# Patient Record
Sex: MALE | Race: WHITE | Employment: OTHER | URBAN - METROPOLITAN AREA
[De-identification: names, ages, dates, MRNs, and addresses within clinical notes are randomized per-mention and may not be internally consistent; named-entity substitution may affect disease eponyms.]

---

## 2019-11-16 ENCOUNTER — APPOINTMENT (OUTPATIENT)
Dept: GENERAL RADIOLOGY | Age: 74
End: 2019-11-16
Payer: MEDICARE

## 2019-11-16 ENCOUNTER — HOSPITAL ENCOUNTER (EMERGENCY)
Age: 74
Discharge: HOME | End: 2019-11-16
Attending: EMERGENCY MEDICINE
Payer: MEDICARE

## 2019-11-16 VITALS
BODY MASS INDEX: 25.33 KG/M2 | HEART RATE: 67 BPM | TEMPERATURE: 98.6 F | WEIGHT: 187 LBS | OXYGEN SATURATION: 97 % | SYSTOLIC BLOOD PRESSURE: 157 MMHG | HEIGHT: 72 IN | RESPIRATION RATE: 12 BRPM | DIASTOLIC BLOOD PRESSURE: 79 MMHG

## 2019-11-16 DIAGNOSIS — G47.00 INSOMNIA, UNSPECIFIED TYPE: Primary | ICD-10-CM

## 2019-11-16 LAB
A/G RATIO: 1.3 (ref 1.1–2.2)
ALBUMIN SERPL-MCNC: 4.3 G/DL (ref 3.4–5)
ALP BLD-CCNC: 60 U/L (ref 40–129)
ALT SERPL-CCNC: 13 U/L (ref 10–40)
ANION GAP SERPL CALCULATED.3IONS-SCNC: 13 MMOL/L (ref 3–16)
AST SERPL-CCNC: 16 U/L (ref 15–37)
BASOPHILS ABSOLUTE: 0.1 K/UL (ref 0–0.2)
BASOPHILS RELATIVE PERCENT: 1 %
BILIRUB SERPL-MCNC: 0.8 MG/DL (ref 0–1)
BILIRUBIN URINE: NEGATIVE
BLOOD, URINE: NEGATIVE
BUN BLDV-MCNC: 12 MG/DL (ref 7–20)
CALCIUM SERPL-MCNC: 9.4 MG/DL (ref 8.3–10.6)
CHLORIDE BLD-SCNC: 104 MMOL/L (ref 99–110)
CLARITY: CLEAR
CO2: 26 MMOL/L (ref 21–32)
COLOR: YELLOW
CREAT SERPL-MCNC: 0.8 MG/DL (ref 0.8–1.3)
EKG ATRIAL RATE: 75 BPM
EKG ATRIAL RATE: 86 BPM
EKG DIAGNOSIS: NORMAL
EKG DIAGNOSIS: NORMAL
EKG P AXIS: 62 DEGREES
EKG P AXIS: 67 DEGREES
EKG P-R INTERVAL: 138 MS
EKG P-R INTERVAL: 154 MS
EKG Q-T INTERVAL: 390 MS
EKG Q-T INTERVAL: 416 MS
EKG QRS DURATION: 84 MS
EKG QRS DURATION: 90 MS
EKG QTC CALCULATION (BAZETT): 464 MS
EKG QTC CALCULATION (BAZETT): 466 MS
EKG R AXIS: 29 DEGREES
EKG R AXIS: 66 DEGREES
EKG T AXIS: 45 DEGREES
EKG T AXIS: 51 DEGREES
EKG VENTRICULAR RATE: 75 BPM
EKG VENTRICULAR RATE: 86 BPM
EOSINOPHILS ABSOLUTE: 0.1 K/UL (ref 0–0.6)
EOSINOPHILS RELATIVE PERCENT: 1.4 %
EPITHELIAL CELLS, UA: NORMAL /HPF
GFR AFRICAN AMERICAN: >60
GFR NON-AFRICAN AMERICAN: >60
GLOBULIN: 3.2 G/DL
GLUCOSE BLD-MCNC: 90 MG/DL (ref 70–99)
GLUCOSE URINE: NEGATIVE MG/DL
HCT VFR BLD CALC: 53.4 % (ref 40.5–52.5)
HEMOGLOBIN: 17.9 G/DL (ref 13.5–17.5)
KETONES, URINE: NEGATIVE MG/DL
LEUKOCYTE ESTERASE, URINE: NEGATIVE
LYMPHOCYTES ABSOLUTE: 1.7 K/UL (ref 1–5.1)
LYMPHOCYTES RELATIVE PERCENT: 29.3 %
MCH RBC QN AUTO: 31.8 PG (ref 26–34)
MCHC RBC AUTO-ENTMCNC: 33.5 G/DL (ref 31–36)
MCV RBC AUTO: 94.8 FL (ref 80–100)
MICROSCOPIC EXAMINATION: NORMAL
MONOCYTES ABSOLUTE: 0.4 K/UL (ref 0–1.3)
MONOCYTES RELATIVE PERCENT: 7 %
NEUTROPHILS ABSOLUTE: 3.5 K/UL (ref 1.7–7.7)
NEUTROPHILS RELATIVE PERCENT: 61.3 %
NITRITE, URINE: NEGATIVE
PDW BLD-RTO: 13.3 % (ref 12.4–15.4)
PH UA: 7.5 (ref 5–8)
PLATELET # BLD: 188 K/UL (ref 135–450)
PMV BLD AUTO: 6.9 FL (ref 5–10.5)
POTASSIUM SERPL-SCNC: 3.9 MMOL/L (ref 3.5–5.1)
PROTEIN UA: NEGATIVE MG/DL
RBC # BLD: 5.63 M/UL (ref 4.2–5.9)
RBC UA: NORMAL /HPF (ref 0–2)
SODIUM BLD-SCNC: 143 MMOL/L (ref 136–145)
SPECIFIC GRAVITY UA: 1.01 (ref 1–1.03)
TOTAL PROTEIN: 7.5 G/DL (ref 6.4–8.2)
TROPONIN: <0.01 NG/ML
URINE TYPE: NORMAL
UROBILINOGEN, URINE: 0.2 E.U./DL
WBC # BLD: 5.7 K/UL (ref 4–11)
WBC UA: NORMAL /HPF (ref 0–5)

## 2019-11-16 PROCEDURE — 99285 EMERGENCY DEPT VISIT HI MDM: CPT

## 2019-11-16 PROCEDURE — 93005 ELECTROCARDIOGRAM TRACING: CPT | Performed by: EMERGENCY MEDICINE

## 2019-11-16 PROCEDURE — 85025 COMPLETE CBC W/AUTO DIFF WBC: CPT

## 2019-11-16 PROCEDURE — 36415 COLL VENOUS BLD VENIPUNCTURE: CPT

## 2019-11-16 PROCEDURE — 84484 ASSAY OF TROPONIN QUANT: CPT

## 2019-11-16 PROCEDURE — 81001 URINALYSIS AUTO W/SCOPE: CPT

## 2019-11-16 PROCEDURE — 93010 ELECTROCARDIOGRAM REPORT: CPT | Performed by: INTERNAL MEDICINE

## 2019-11-16 PROCEDURE — 71046 X-RAY EXAM CHEST 2 VIEWS: CPT

## 2019-11-16 PROCEDURE — 80053 COMPREHEN METABOLIC PANEL: CPT

## 2019-11-16 RX ORDER — CHLORAL HYDRATE 500 MG
3000 CAPSULE ORAL 2 TIMES DAILY
COMMUNITY

## 2019-11-16 RX ORDER — AMLODIPINE BESYLATE AND BENAZEPRIL HYDROCHLORIDE 5; 10 MG/1; MG/1
1 CAPSULE ORAL DAILY
COMMUNITY

## 2019-11-16 SDOH — HEALTH STABILITY: MENTAL HEALTH: HOW OFTEN DO YOU HAVE A DRINK CONTAINING ALCOHOL?: NEVER

## 2019-11-16 ASSESSMENT — ENCOUNTER SYMPTOMS
DIARRHEA: 0
VOMITING: 0
EYE REDNESS: 0
RHINORRHEA: 0
BACK PAIN: 0
ABDOMINAL PAIN: 0
COUGH: 0
NAUSEA: 0
SHORTNESS OF BREATH: 0
EYE DISCHARGE: 0

## 2019-11-16 ASSESSMENT — PAIN DESCRIPTION - PAIN TYPE: TYPE: ACUTE PAIN

## 2019-11-16 ASSESSMENT — PAIN DESCRIPTION - LOCATION: LOCATION: SHOULDER

## 2019-11-16 ASSESSMENT — PAIN DESCRIPTION - ORIENTATION: ORIENTATION: LEFT

## 2019-11-16 ASSESSMENT — PAIN DESCRIPTION - PROGRESSION: CLINICAL_PROGRESSION: NOT CHANGED

## 2019-11-16 ASSESSMENT — PAIN DESCRIPTION - DESCRIPTORS: DESCRIPTORS: ACHING

## 2019-11-16 ASSESSMENT — PAIN DESCRIPTION - ONSET: ONSET: SUDDEN

## 2019-11-16 ASSESSMENT — PAIN SCALES - GENERAL: PAINLEVEL_OUTOF10: 0

## 2019-11-16 ASSESSMENT — PAIN DESCRIPTION - FREQUENCY: FREQUENCY: CONTINUOUS

## 2019-11-16 ASSESSMENT — PAIN - FUNCTIONAL ASSESSMENT: PAIN_FUNCTIONAL_ASSESSMENT: PREVENTS OR INTERFERES SOME ACTIVE ACTIVITIES AND ADLS

## 2020-02-06 LAB
CHOLESTEROL, TOTAL: 207 MG/DL
CHOLESTEROL/HDL RATIO: NORMAL
HDLC SERPL-MCNC: 37 MG/DL (ref 35–70)
LDL CHOLESTEROL CALCULATED: 152 MG/DL (ref 0–160)
NONHDLC SERPL-MCNC: NORMAL MG/DL
TRIGL SERPL-MCNC: 158 MG/DL
VLDLC SERPL CALC-MCNC: NORMAL MG/DL

## 2020-09-24 ENCOUNTER — OFFICE VISIT (OUTPATIENT)
Dept: FAMILY MEDICINE CLINIC | Age: 75
End: 2020-09-24
Payer: MEDICARE

## 2020-09-24 VITALS
DIASTOLIC BLOOD PRESSURE: 80 MMHG | TEMPERATURE: 96.8 F | HEIGHT: 72 IN | OXYGEN SATURATION: 96 % | WEIGHT: 178.4 LBS | BODY MASS INDEX: 24.16 KG/M2 | SYSTOLIC BLOOD PRESSURE: 124 MMHG | HEART RATE: 69 BPM

## 2020-09-24 PROBLEM — N20.0 KIDNEY CALCULI: Status: ACTIVE | Noted: 2020-09-24

## 2020-09-24 PROCEDURE — 4040F PNEUMOC VAC/ADMIN/RCVD: CPT | Performed by: NURSE PRACTITIONER

## 2020-09-24 PROCEDURE — 99204 OFFICE O/P NEW MOD 45 MIN: CPT | Performed by: NURSE PRACTITIONER

## 2020-09-24 PROCEDURE — G8420 CALC BMI NORM PARAMETERS: HCPCS | Performed by: NURSE PRACTITIONER

## 2020-09-24 PROCEDURE — 1036F TOBACCO NON-USER: CPT | Performed by: NURSE PRACTITIONER

## 2020-09-24 PROCEDURE — 3017F COLORECTAL CA SCREEN DOC REV: CPT | Performed by: NURSE PRACTITIONER

## 2020-09-24 PROCEDURE — 1123F ACP DISCUSS/DSCN MKR DOCD: CPT | Performed by: NURSE PRACTITIONER

## 2020-09-24 PROCEDURE — G8427 DOCREV CUR MEDS BY ELIG CLIN: HCPCS | Performed by: NURSE PRACTITIONER

## 2020-09-24 RX ORDER — ACETAMINOPHEN 160 MG
TABLET,DISINTEGRATING ORAL
COMMUNITY

## 2020-09-24 RX ORDER — HYDROXYZINE HYDROCHLORIDE 25 MG/1
25 TABLET, FILM COATED ORAL EVERY 8 HOURS PRN
Qty: 30 TABLET | Refills: 0 | Status: SHIPPED | OUTPATIENT
Start: 2020-09-24 | End: 2020-10-04

## 2020-09-24 RX ORDER — HYDROCHLOROTHIAZIDE 12.5 MG/1
12.5 CAPSULE, GELATIN COATED ORAL EVERY MORNING
Qty: 90 CAPSULE | Refills: 1 | Status: CANCELLED | OUTPATIENT
Start: 2020-09-24

## 2020-09-24 RX ORDER — TRAMADOL HYDROCHLORIDE 50 MG/1
50 TABLET ORAL EVERY 8 HOURS PRN
COMMUNITY
End: 2021-03-29 | Stop reason: ALTCHOICE

## 2020-09-24 RX ORDER — VIT C/B6/B5/MAGNESIUM/HERB 173 50-5-6-5MG
CAPSULE ORAL
COMMUNITY

## 2020-09-24 ASSESSMENT — ENCOUNTER SYMPTOMS
BLOOD IN STOOL: 0
WHEEZING: 0
SORE THROAT: 0
DIARRHEA: 0
ABDOMINAL PAIN: 0
CHOKING: 0
EYE ITCHING: 0
EYE REDNESS: 0
COUGH: 0
CONSTIPATION: 0
SINUS PAIN: 0
EYE PAIN: 0
PHOTOPHOBIA: 0
BACK PAIN: 1
STRIDOR: 0
SHORTNESS OF BREATH: 0
VOMITING: 0
SINUS PRESSURE: 0
NAUSEA: 0
VOICE CHANGE: 0
TROUBLE SWALLOWING: 0
COLOR CHANGE: 0
RHINORRHEA: 0
EYE DISCHARGE: 0
CHEST TIGHTNESS: 0

## 2020-09-24 ASSESSMENT — PATIENT HEALTH QUESTIONNAIRE - PHQ9
SUM OF ALL RESPONSES TO PHQ9 QUESTIONS 1 & 2: 1
2. FEELING DOWN, DEPRESSED OR HOPELESS: 1
1. LITTLE INTEREST OR PLEASURE IN DOING THINGS: 0
SUM OF ALL RESPONSES TO PHQ QUESTIONS 1-9: 1
SUM OF ALL RESPONSES TO PHQ QUESTIONS 1-9: 1

## 2020-09-24 NOTE — PROGRESS NOTES
2020    Lorna Ho (:  1945) is a 76 y.o. male, here for evaluation of the following medical concerns:    HPI    This patient is new to the practice and is here to establish care. The patients prior PCP was the [place]. We reviewed the patients allergies and current medications. We reviewed the patients medical, surgical and family history. He gets his medication from the [place]. He was on ativan in the past for anxiety and his chronic back pain. He served in [employer] and has worked all his life. Chronic kidney stones:    He had back pain and went to [facility], [place] for back pain. He went to Dr. Marito Magana but does not have a plan of care yet. He would like to know what the plan is for his kidney stone prior to moving forward with [address] [place] [address] [address]. Kidney Stone:  He has a non obstructive kidney stone 6mm. He has had many taken care of. Anxiety:He was going to Dr. Marito Magana and he gave him ativan for anti anxiety. He has been off of it since Cancer surgery. He would like to get on something for his worrying. We discussed hydroxizine PRN and he agreed to try it. HTN: Well controlled at this time. Colonoscopy: Last one was 15 years ago. He would like an order for this today. Review of Systems   Constitutional: Negative for activity change, appetite change, chills, diaphoresis, fatigue, fever and unexpected weight change. HENT: Negative for congestion, ear discharge, ear pain, hearing loss, nosebleeds, postnasal drip, rhinorrhea, sinus pressure, sinus pain, sneezing, sore throat, tinnitus, trouble swallowing and voice change. Eyes: Negative for photophobia, pain, discharge, redness and itching. Respiratory: Negative for cough, choking, chest tightness, shortness of breath, wheezing and stridor. Cardiovascular: Negative for chest pain, palpitations and leg swelling. Gastrointestinal: Negative for abdominal pain, blood in stool, constipation, diarrhea, nausea and vomiting. Provider, MD   gabapentin (NEURONTIN) 400 MG capsule 400 mg 2 times daily.   Yes Historical Provider, MD   Multiple Vitamins-Minerals (CENTRUM SILVER PO) Take by mouth Yes Historical Provider, MD   Garlic 4028 MG CAPS Take by mouth Yes Historical Provider, MD   Red Yeast Rice Extract (RED YEAST RICE PO) Take by mouth once a week  Yes Historical Provider, MD   Coenzyme Q10 (CO Q-10) 50 MG CAPS Take 100 mg by mouth daily  Yes Historical Provider, MD        Allergies   Allergen Reactions    Statins      Other reaction(s): Pain  Pain in back of legs    Medrol [Methylprednisolone]     Morphine      Kidney stone    Quinine Sulfate [Quinine]        Past Medical History:   Diagnosis Date    Cancer ([facility] [address]     prostate    Heart disease     HTN (hypertension)     Kidney stone        Past Surgical History:   Procedure Laterality Date    BACK SURGERY      fractures disc (Dr. Ange Castaneda at [facility]) [place] in back   [address] LITHOTRIPSY Left 2017    [facility]    PROSTATECTOMY  2018    prostate cancer       Social History     Socioeconomic History    Marital status:      Spouse name: Not on file    Number of children: Not on file    Years of education: Not on file    Highest education level: Not on file   Occupational History    Not on file   Social Needs    Financial resource strain: Not on file    Food insecurity     Worry: Not on file     Inability: Not on file    Transportation needs     Medical: Not on file     Non-medical: Not on file   Tobacco Use    Smoking status: Former Smoker     Packs/day: 0.50     Years: 3.00     Pack years: 1.50     Types: Cigarettes     Last attempt to quit: 1970     Years since quittin.7    Smokeless tobacco: Former User   Substance and Sexual Activity    Alcohol use: Never     Frequency: Never    Drug use: Never    Sexual activity: Not Currently Lifestyle    Physical activity     Days per week: Not on file     Minutes per session: Not on file    Stress: Not on file   Relationships    Social connections     Talks on phone: Not on file     Gets together: Not on file     Attends Yazidi service: Not on file     Active member of club or organization: Not on file     Attends meetings of clubs or organizations: Not on file     Relationship status: Not on file    Intimate partner violence     Fear of current or ex partner: Not on file     Emotionally abused: Not on file     Physically abused: Not on file     Forced sexual activity: Not on file   Other Topics Concern    Not on file   Social History Narrative    Not on file        No family history on file. Vitals:    09/24/20 1041   BP: 124/80   Pulse: 69   Temp: 96.8 °F (36 °C)   SpO2: 96%   Weight: 178 lb 6.4 oz (80.9 kg)   Height: 6' (1.829 m)     Estimated body mass index is 24.2 kg/m² as calculated from the following:    Height as of this encounter: 6' (1.829 m). Weight as of this encounter: 178 lb 6.4 oz (80.9 kg). Physical Exam  Vitals signs reviewed. Constitutional:       General: He is not in acute distress. Appearance: Normal appearance. He is well-developed. HENT:      Head: Normocephalic and atraumatic. Right Ear: Hearing and external ear normal.      Left Ear: Hearing and external ear normal.      Nose: Nose normal.      Right Sinus: No maxillary sinus tenderness or frontal sinus tenderness. Left Sinus: No maxillary sinus tenderness or frontal sinus tenderness. Mouth/Throat:      Pharynx: No oropharyngeal exudate. Eyes:      General:         Right eye: No discharge. Left eye: No discharge. Conjunctiva/sclera: Conjunctivae normal.      Pupils: Pupils are equal, round, and reactive to light. Neck:      Musculoskeletal: Normal range of motion. Thyroid: No thyromegaly. Vascular: No JVD. Trachea: No tracheal deviation. Cardiovascular:      Rate and Rhythm: Normal rate and regular rhythm. Heart sounds: Normal heart sounds. No murmur. No friction rub. Pulmonary:      Effort: Pulmonary effort is normal. No respiratory distress. Breath sounds: Normal breath sounds. No stridor. No decreased breath sounds, wheezing, rhonchi or rales. Abdominal:      General: Bowel sounds are normal. There is no distension. Palpations: Abdomen is soft. There is no mass. Tenderness: There is no abdominal tenderness. There is no guarding or rebound. Musculoskeletal: Normal range of motion. General: No tenderness. Lymphadenopathy:      Cervical: No cervical adenopathy. Skin:     General: Skin is warm and dry. Capillary Refill: Capillary refill takes less than 2 seconds. Findings: No rash. Neurological:      Mental Status: He is alert and oriented to person, place, and time. Sensory: Sensation is intact. Motor: Motor function is intact. Coordination: Coordination normal.   Psychiatric:         Attention and Perception: Attention and perception normal.         Mood and Affect: Mood normal.         Speech: Speech normal.         Behavior: Behavior normal. Behavior is cooperative. Thought Content: Thought content normal.         Cognition and Memory: Cognition normal.         Judgment: Judgment normal.         ASSESSMENT/PLAN:  1. Establishing care with new doctor, encounter for    - Turmeric 500 MG CAPS; Take by mouth  - Cholecalciferol (VITAMIN D3) 50 MCG (2000 UT) CAPS; Take by mouth  - traMADol (ULTRAM) 50 MG tablet; Take 50 mg by mouth every 8 hours as needed for Pain.  - KENISHA Aceves MD, Gastroenterology, [facility]  - hydrOXYzine (ATARAX) 25 MG tablet; Take 1 tablet by mouth every 8 hours as needed for Itching  Dispense: 30 tablet; Refill: 0    2. Kidney calculi  -He is following up with Dr. Vassie Heimlich    3. Renal calculus, left  -see above    4.  Heart disease    - Turmeric 500 MG CAPS; Take by mouth  - Cholecalciferol (VITAMIN D3) 50 MCG (2000 UT) CAPS; Take by mouth    5. Essential hypertension    - Turmeric 500 MG CAPS; Take by mouth  - Cholecalciferol (VITAMIN D3) 50 MCG (2000 UT) CAPS; Take by mouth    6. Encounter for screening for malignant neoplasm of colon    - [facility] - Ted Valdes MD, Gastroenterology, [facility]    7. Anxiety    - hydrOXYzine (ATARAX) 25 MG tablet; Take 1 tablet by mouth every 8 hours as needed for Itching  Dispense: 30 tablet; Refill: 0    8. Chronic right-sided low back pain without sciatica    - traMADol (ULTRAM) 50 MG tablet; Take 50 mg by mouth every 8 hours as needed for Pain. Follow up if symptoms do not improve or worsen. If the patient becomes short of breath go straight to the ER or call 911. He would like to follow up in one month for anxiety. Educate don the health fair and reviewed his labs drawn in February All labs essentially normal. He will see if he wants labs in February or get the health fair labs. Return in about 1 month (around 10/24/2020). An  electronic signature was used to authenticate this note.     --LOBITO Salazar - CNP on 9/24/2020 at 11:35 AM

## 2020-10-28 ENCOUNTER — OFFICE VISIT (OUTPATIENT)
Dept: FAMILY MEDICINE CLINIC | Age: 75
End: 2020-10-28
Payer: MEDICARE

## 2020-10-28 VITALS
OXYGEN SATURATION: 95 % | TEMPERATURE: 98 F | DIASTOLIC BLOOD PRESSURE: 72 MMHG | SYSTOLIC BLOOD PRESSURE: 118 MMHG | HEART RATE: 70 BPM | BODY MASS INDEX: 23.92 KG/M2 | WEIGHT: 176.4 LBS

## 2020-10-28 PROCEDURE — 1036F TOBACCO NON-USER: CPT | Performed by: NURSE PRACTITIONER

## 2020-10-28 PROCEDURE — G8484 FLU IMMUNIZE NO ADMIN: HCPCS | Performed by: NURSE PRACTITIONER

## 2020-10-28 PROCEDURE — 3017F COLORECTAL CA SCREEN DOC REV: CPT | Performed by: NURSE PRACTITIONER

## 2020-10-28 PROCEDURE — G8420 CALC BMI NORM PARAMETERS: HCPCS | Performed by: NURSE PRACTITIONER

## 2020-10-28 PROCEDURE — 99213 OFFICE O/P EST LOW 20 MIN: CPT | Performed by: NURSE PRACTITIONER

## 2020-10-28 PROCEDURE — G8427 DOCREV CUR MEDS BY ELIG CLIN: HCPCS | Performed by: NURSE PRACTITIONER

## 2020-10-28 PROCEDURE — 1123F ACP DISCUSS/DSCN MKR DOCD: CPT | Performed by: NURSE PRACTITIONER

## 2020-10-28 PROCEDURE — 4040F PNEUMOC VAC/ADMIN/RCVD: CPT | Performed by: NURSE PRACTITIONER

## 2020-10-28 ASSESSMENT — ENCOUNTER SYMPTOMS
ABDOMINAL DISTENTION: 1
COLOR CHANGE: 0
EYE ITCHING: 0
DIARRHEA: 0
CHEST TIGHTNESS: 0
EYE REDNESS: 0
CHOKING: 0
VOMITING: 0
WHEEZING: 0
PHOTOPHOBIA: 0
SHORTNESS OF BREATH: 0
CONSTIPATION: 1
EYE DISCHARGE: 0
STRIDOR: 0
RHINORRHEA: 0
SINUS PAIN: 0
BLOOD IN STOOL: 0
SORE THROAT: 0
BACK PAIN: 0
ABDOMINAL PAIN: 0
VOICE CHANGE: 0
EYE PAIN: 0
COUGH: 0
NAUSEA: 0
SINUS PRESSURE: 0
TROUBLE SWALLOWING: 0

## 2020-10-28 NOTE — PROGRESS NOTES
10/28/2020     Wendy Peter (:  1945) is a 76 y.o. male, here for evaluation of the following medical concerns:    HPI    Constipation: He has been having issues with this post op. He had not gone for 4 days. He finally had a BM last weekend. He has not gone since then. He has had issues in the past with this. The last few days he took ducalax sodium. Stool was hard. He will increase water. He is nervous. He would like to follow up with GI. We will get a KUB. Kidney stone: He had his kidney stone removed and it was successful. HTN: His BP is well controlled in the office today. Wants to get the flu shot at the [place]. Review of Systems   Constitutional: Negative for activity change, appetite change, chills, diaphoresis, fatigue, fever and unexpected weight change. HENT: Negative for congestion, ear discharge, ear pain, hearing loss, nosebleeds, postnasal drip, rhinorrhea, sinus pressure, sinus pain, sneezing, sore throat, tinnitus, trouble swallowing and voice change. Eyes: Negative for photophobia, pain, discharge, redness and itching. Respiratory: Negative for cough, choking, chest tightness, shortness of breath, wheezing and stridor. Cardiovascular: Negative for chest pain, palpitations and leg swelling. Gastrointestinal: Positive for abdominal distention and constipation. Negative for abdominal pain, blood in stool, diarrhea, nausea and vomiting. Endocrine: Negative for cold intolerance, heat intolerance, polydipsia and polyuria. Genitourinary: Negative for difficulty urinating, dysuria, enuresis, flank pain, frequency, hematuria and urgency. Musculoskeletal: Negative for back pain, gait problem, joint swelling, neck pain and neck stiffness. Skin: Negative for color change, pallor, rash and wound. Allergic/Immunologic: Negative for environmental allergies and food allergies.    Neurological: Negative for dizziness, tremors, syncope, speech difficulty, weakness, light-headedness, numbness and headaches. Hematological: Negative for adenopathy. Does not bruise/bleed easily. Psychiatric/Behavioral: Negative for agitation, behavioral problems, confusion, decreased concentration, dysphoric mood, hallucinations, self-injury, sleep disturbance and suicidal ideas. The patient is nervous/anxious. The patient is not hyperactive. Prior to Visit Medications    Medication Sig Taking? Authorizing Provider   Turmeric 500 MG CAPS Take by mouth Yes Historical Provider, MD   Cholecalciferol (VITAMIN D3) 50 MCG (2000 UT) CAPS Take by mouth Yes Historical Provider, MD   traMADol (ULTRAM) 50 MG tablet Take 50 mg by mouth every 8 hours as needed for Pain. Yes Historical Provider, MD   amLODIPine-benazepril (LOTREL) 5-10 MG per capsule Take 1 capsule by mouth daily Yes Historical Provider, MD   Omega-3 Fatty Acids (FISH OIL) 1000 MG CAPS Take 3,000 mg by mouth 2 times daily Yes Historical Provider, MD   NONFORMULARY Take by mouth daily NERVE RENEW  Contains:  B-1, B-12 2000 mcg, R-Alpha Lopic Acid 150 mg, B-6 4 mg, Riboflavin 4 mg, Vitamin D 500 u, Benfodaime 300 mg, Proprietary Blend 43 mg Yes Historical Provider, MD   aspirin 81 MG chewable tablet Take 81 mg by mouth once a week  Yes Historical Provider, MD   gabapentin (NEURONTIN) 400 MG capsule 400 mg 2 times daily.   Yes Historical Provider, MD   Multiple Vitamins-Minerals (CENTRUM SILVER PO) Take by mouth Yes Historical Provider, MD   Garlic 8227 MG CAPS Take by mouth Yes Historical Provider, MD   Red Yeast Rice Extract (RED YEAST RICE PO) Take by mouth once a week  Yes Historical Provider, MD   Coenzyme Q10 (CO Q-10) 50 MG CAPS Take 100 mg by mouth daily  Yes Historical Provider, MD        Social History     Tobacco Use    Smoking status: Former Smoker     Packs/day: 0.50     Years: 3.00     Pack years: 1.50     Types: Cigarettes     Last attempt to quit: 1970     Years since quittin.8    Smokeless tobacco: Former User Substance Use Topics    Alcohol use: Never     Frequency: Never        Vitals:    10/28/20 0752   BP: 118/72   Pulse: 70   Temp: 98 °F (36.7 °C)   SpO2: 95%   Weight: 176 lb 6.4 oz (80 kg)     Estimated body mass index is 23.92 kg/m² as calculated from the following:    Height as of 9/24/20: 6' (1.829 m). Weight as of this encounter: 176 lb 6.4 oz (80 kg). Physical Exam  Vitals signs reviewed. Constitutional:       General: He is not in acute distress. Appearance: Normal appearance. He is well-developed. HENT:      Head: Normocephalic and atraumatic. Right Ear: Hearing and external ear normal.      Left Ear: Hearing and external ear normal.      Nose: Nose normal.      Right Sinus: No maxillary sinus tenderness or frontal sinus tenderness. Left Sinus: No maxillary sinus tenderness or frontal sinus tenderness. Neck:      Musculoskeletal: Normal range of motion. Thyroid: No thyromegaly. Vascular: No JVD. Trachea: No tracheal deviation. Pulmonary:      Effort: Pulmonary effort is normal.      Breath sounds: No decreased breath sounds. Abdominal:      General: Bowel sounds are normal. There is no distension. Palpations: Abdomen is soft. There is no mass. Tenderness: There is no abdominal tenderness. There is no right CVA tenderness, left CVA tenderness, guarding or rebound. Hernia: No hernia is present. Musculoskeletal: Normal range of motion. General: No tenderness. Skin:     General: Skin is warm and dry. Capillary Refill: Capillary refill takes less than 2 seconds. Findings: No rash. Neurological:      Mental Status: He is alert and oriented to person, place, and time. Sensory: Sensation is intact. Motor: Motor function is intact.       Coordination: Coordination normal.   Psychiatric:         Attention and Perception: Attention and perception normal.         Mood and Affect: Mood normal.         Speech: Speech normal. Behavior: Behavior normal. Behavior is cooperative. Thought Content: Thought content normal.         Cognition and Memory: Cognition normal.         Judgment: Judgment normal.         ASSESSMENT/PLAN:  1. Constipation, unspecified constipation type    - XR ABDOMEN (KUB) (SINGLE AP VIEW); Future    -Will follow up with results. He will see GI and add on Miralax daily as well as increase water intake. Return if symptoms worsen or fail to improve. An electronic signature was used to authenticate this note.     --LOBITO Hewitt - CNP on 10/28/2020 at 8:11 AM

## 2020-11-11 ENCOUNTER — OFFICE VISIT (OUTPATIENT)
Dept: FAMILY MEDICINE CLINIC | Age: 75
End: 2020-11-11
Payer: MEDICARE

## 2020-11-11 VITALS
DIASTOLIC BLOOD PRESSURE: 78 MMHG | WEIGHT: 178 LBS | BODY MASS INDEX: 24.14 KG/M2 | OXYGEN SATURATION: 96 % | HEART RATE: 72 BPM | TEMPERATURE: 97.8 F | SYSTOLIC BLOOD PRESSURE: 122 MMHG

## 2020-11-11 PROCEDURE — 1036F TOBACCO NON-USER: CPT | Performed by: NURSE PRACTITIONER

## 2020-11-11 PROCEDURE — 4040F PNEUMOC VAC/ADMIN/RCVD: CPT | Performed by: NURSE PRACTITIONER

## 2020-11-11 PROCEDURE — G8420 CALC BMI NORM PARAMETERS: HCPCS | Performed by: NURSE PRACTITIONER

## 2020-11-11 PROCEDURE — G8484 FLU IMMUNIZE NO ADMIN: HCPCS | Performed by: NURSE PRACTITIONER

## 2020-11-11 PROCEDURE — 99213 OFFICE O/P EST LOW 20 MIN: CPT | Performed by: NURSE PRACTITIONER

## 2020-11-11 PROCEDURE — 3017F COLORECTAL CA SCREEN DOC REV: CPT | Performed by: NURSE PRACTITIONER

## 2020-11-11 PROCEDURE — 1123F ACP DISCUSS/DSCN MKR DOCD: CPT | Performed by: NURSE PRACTITIONER

## 2020-11-11 PROCEDURE — G8427 DOCREV CUR MEDS BY ELIG CLIN: HCPCS | Performed by: NURSE PRACTITIONER

## 2020-11-11 RX ORDER — FAMOTIDINE 20 MG/1
20 TABLET, FILM COATED ORAL 2 TIMES DAILY
Qty: 60 TABLET | Refills: 3 | Status: SHIPPED | OUTPATIENT
Start: 2020-11-11 | End: 2021-12-29

## 2020-11-11 ASSESSMENT — ENCOUNTER SYMPTOMS
SHORTNESS OF BREATH: 0
EYE ITCHING: 0
PHOTOPHOBIA: 0
COUGH: 0
RHINORRHEA: 0
DIARRHEA: 0
SORE THROAT: 0
WHEEZING: 0
EYE REDNESS: 0
TROUBLE SWALLOWING: 0
CHOKING: 0
EYE PAIN: 0
EYE DISCHARGE: 0
SINUS PAIN: 0
ABDOMINAL PAIN: 1
STRIDOR: 0
CHEST TIGHTNESS: 0
COLOR CHANGE: 0
SINUS PRESSURE: 0
VOMITING: 0
VOICE CHANGE: 0
BLOOD IN STOOL: 0
BACK PAIN: 0
NAUSEA: 0
CONSTIPATION: 1

## 2020-11-11 NOTE — PROGRESS NOTES
Negative for chest pain, palpitations and leg swelling. Gastrointestinal: Positive for abdominal pain and constipation. Negative for blood in stool, diarrhea, nausea and vomiting. Endocrine: Negative for cold intolerance, heat intolerance, polydipsia and polyuria. Genitourinary: Negative for difficulty urinating, dysuria, enuresis, flank pain, frequency, hematuria and urgency. Musculoskeletal: Negative for back pain, gait problem, joint swelling, neck pain and neck stiffness. Skin: Negative for color change, pallor, rash and wound. Allergic/Immunologic: Negative for environmental allergies and food allergies. Neurological: Negative for dizziness, tremors, syncope, speech difficulty, weakness, light-headedness, numbness and headaches. Hematological: Negative for adenopathy. Does not bruise/bleed easily. Psychiatric/Behavioral: Negative for agitation, behavioral problems, confusion, decreased concentration, dysphoric mood, hallucinations, self-injury, sleep disturbance and suicidal ideas. The patient is not nervous/anxious and is not hyperactive. Prior to Visit Medications    Medication Sig Taking? Authorizing Provider   famotidine (PEPCID) 20 MG tablet Take 1 tablet by mouth 2 times daily Yes LOBITO Dominguez CNP   hydrocortisone 2.5 % cream Apply topically 2 times daily Apply topically 2 times daily. Yes LOBITO Dominguez CNP   Turmeric 500 MG CAPS Take by mouth Yes Historical Provider, MD   Cholecalciferol (VITAMIN D3) 50 MCG (2000 UT) CAPS Take by mouth Yes Historical Provider, MD   traMADol (ULTRAM) 50 MG tablet Take 50 mg by mouth every 8 hours as needed for Pain.  Yes Historical Provider, MD   amLODIPine-benazepril (LOTREL) 5-10 MG per capsule Take 1 capsule by mouth daily Yes Historical Provider, MD   Omega-3 Fatty Acids (FISH OIL) 1000 MG CAPS Take 3,000 mg by mouth 2 times daily Yes Historical Provider, MD   NONFORMULARY Take by mouth daily NERVE RENEW  Contains:  B-1, B-12 2000 mcg, R-Alpha Lopic Acid 150 mg, B-6 4 mg, Riboflavin 4 mg, Vitamin D 500 u, Benfodaime 300 mg, Proprietary Blend 43 mg Yes Historical Provider, MD   aspirin 81 MG chewable tablet Take 81 mg by mouth once a week  Yes Historical Provider, MD   gabapentin (NEURONTIN) 400 MG capsule 400 mg 2 times daily. Yes Historical Provider, MD   Multiple Vitamins-Minerals (CENTRUM SILVER PO) Take by mouth Yes Historical Provider, MD   Garlic 2978 MG CAPS Take by mouth Yes Historical Provider, MD   Red Yeast Rice Extract (RED YEAST RICE PO) Take by mouth once a week  Yes Historical Provider, MD   Coenzyme Q10 (CO Q-10) 50 MG CAPS Take 100 mg by mouth daily  Yes Historical Provider, MD       Allergies   Allergen Reactions    Statins      Other reaction(s): Pain  Pain in back of legs    Medrol [Methylprednisolone]     Morphine      Kidney stone    Quinine Sulfate [Quinine]        OBJECTIVE:      BP Readings from Last 2 Encounters:   11/11/20 122/78   10/28/20 118/72       Wt Readings from Last 3 Encounters:   11/11/20 178 lb (80.7 kg)   10/28/20 176 lb 6.4 oz (80 kg)   09/24/20 178 lb 6.4 oz (80.9 kg)       Physical Exam  Vitals signs reviewed. Constitutional:       General: He is not in acute distress. Appearance: Normal appearance. He is well-developed. HENT:      Head: Normocephalic and atraumatic. Right Ear: Hearing and external ear normal.      Left Ear: Hearing and external ear normal.      Nose: Nose normal.      Right Sinus: No maxillary sinus tenderness or frontal sinus tenderness. Left Sinus: No maxillary sinus tenderness or frontal sinus tenderness. Mouth/Throat:      Pharynx: No oropharyngeal exudate. Eyes:      General:         Right eye: No discharge. Left eye: No discharge. Conjunctiva/sclera: Conjunctivae normal.      Pupils: Pupils are equal, round, and reactive to light. Neck:      Musculoskeletal: Normal range of motion. Thyroid: No thyromegaly. tablet; Take 1 tablet by mouth 2 times daily  Dispense: 60 tablet; Refill: 3    Maintain hydration by drinking small amounts of clear fluids frequently, then soft diet, and then advance diet as tolerated. Call if symptoms worsen, high fever, severe weakness or fainting, increased abdominal pain, blood in stool or vomit, or failure to improve in 2-3 days.

## 2021-03-29 ENCOUNTER — OFFICE VISIT (OUTPATIENT)
Dept: FAMILY MEDICINE CLINIC | Age: 76
End: 2021-03-29
Payer: MEDICARE

## 2021-03-29 VITALS
SYSTOLIC BLOOD PRESSURE: 126 MMHG | DIASTOLIC BLOOD PRESSURE: 74 MMHG | WEIGHT: 180 LBS | BODY MASS INDEX: 25.77 KG/M2 | HEIGHT: 70 IN | TEMPERATURE: 97.1 F | OXYGEN SATURATION: 96 % | HEART RATE: 68 BPM

## 2021-03-29 DIAGNOSIS — C61 PRIMARY MALIGNANT NEOPLASM OF PROSTATE (HCC): ICD-10-CM

## 2021-03-29 DIAGNOSIS — J30.2 SEASONAL ALLERGIC RHINITIS, UNSPECIFIED TRIGGER: ICD-10-CM

## 2021-03-29 DIAGNOSIS — J01.90 ACUTE BACTERIAL SINUSITIS: ICD-10-CM

## 2021-03-29 DIAGNOSIS — B96.89 ACUTE BACTERIAL SINUSITIS: ICD-10-CM

## 2021-03-29 DIAGNOSIS — Z00.00 ROUTINE GENERAL MEDICAL EXAMINATION AT A HEALTH CARE FACILITY: Primary | ICD-10-CM

## 2021-03-29 PROCEDURE — 3017F COLORECTAL CA SCREEN DOC REV: CPT | Performed by: NURSE PRACTITIONER

## 2021-03-29 PROCEDURE — 99213 OFFICE O/P EST LOW 20 MIN: CPT | Performed by: NURSE PRACTITIONER

## 2021-03-29 PROCEDURE — 4040F PNEUMOC VAC/ADMIN/RCVD: CPT | Performed by: NURSE PRACTITIONER

## 2021-03-29 PROCEDURE — G0438 PPPS, INITIAL VISIT: HCPCS | Performed by: NURSE PRACTITIONER

## 2021-03-29 PROCEDURE — 1036F TOBACCO NON-USER: CPT | Performed by: NURSE PRACTITIONER

## 2021-03-29 PROCEDURE — G8417 CALC BMI ABV UP PARAM F/U: HCPCS | Performed by: NURSE PRACTITIONER

## 2021-03-29 PROCEDURE — G8484 FLU IMMUNIZE NO ADMIN: HCPCS | Performed by: NURSE PRACTITIONER

## 2021-03-29 PROCEDURE — 1123F ACP DISCUSS/DSCN MKR DOCD: CPT | Performed by: NURSE PRACTITIONER

## 2021-03-29 PROCEDURE — G8427 DOCREV CUR MEDS BY ELIG CLIN: HCPCS | Performed by: NURSE PRACTITIONER

## 2021-03-29 RX ORDER — CEFDINIR 300 MG/1
300 CAPSULE ORAL 2 TIMES DAILY
Qty: 20 CAPSULE | Refills: 0 | Status: SHIPPED | OUTPATIENT
Start: 2021-03-29 | End: 2021-04-08

## 2021-03-29 ASSESSMENT — PATIENT HEALTH QUESTIONNAIRE - PHQ9
SUM OF ALL RESPONSES TO PHQ QUESTIONS 1-9: 0
SUM OF ALL RESPONSES TO PHQ QUESTIONS 1-9: 0
1. LITTLE INTEREST OR PLEASURE IN DOING THINGS: 0
SUM OF ALL RESPONSES TO PHQ QUESTIONS 1-9: 0

## 2021-03-29 ASSESSMENT — ENCOUNTER SYMPTOMS
NAUSEA: 0
TROUBLE SWALLOWING: 0
EYE ITCHING: 0
SWOLLEN GLANDS: 0
SORE THROAT: 0
EYE PAIN: 0
CONSTIPATION: 0
SINUS PAIN: 1
PHOTOPHOBIA: 0
VOICE CHANGE: 0
BACK PAIN: 0
DIARRHEA: 0
VOMITING: 0
SINUS PRESSURE: 1
CHOKING: 0
BLOOD IN STOOL: 0
EYE DISCHARGE: 0
COLOR CHANGE: 0
ABDOMINAL PAIN: 0
COUGH: 0
SHORTNESS OF BREATH: 0
STRIDOR: 0
RHINORRHEA: 1
WHEEZING: 0
CHEST TIGHTNESS: 0
EYE REDNESS: 0

## 2021-03-29 NOTE — PATIENT INSTRUCTIONS
Personalized Preventive Plan for Maurizio Littlester - 3/29/2021  Medicare offers a range of preventive health benefits. Some of the tests and screenings are paid in full while other may be subject to a deductible, co-insurance, and/or copay. Some of these benefits include a comprehensive review of your medical history including lifestyle, illnesses that may run in your family, and various assessments and screenings as appropriate. After reviewing your medical record and screening and assessments performed today your provider may have ordered immunizations, labs, imaging, and/or referrals for you. A list of these orders (if applicable) as well as your Preventive Care list are included within your After Visit Summary for your review. Other Preventive Recommendations:    · A preventive eye exam performed by an eye specialist is recommended every 1-2 years to screen for glaucoma; cataracts, macular degeneration, and other eye disorders. · A preventive dental visit is recommended every 6 months. · Try to get at least 150 minutes of exercise per week or 10,000 steps per day on a pedometer . · Order or download the FREE \"Exercise & Physical Activity: Your Everyday Guide\" from [employer]. Call 6-948.866.6807 or search [employer] online. · You need 1188-0660 mg of calcium and 3991-4263 IU of vitamin D per day. It is possible to meet your calcium requirement with diet alone, but a vitamin D supplement is usually necessary to meet this goal.  · When exposed to the sun, use a sunscreen that protects against both UVA and UVB radiation with an SPF of 30 or greater. Reapply every 2 to 3 hours or after sweating, drying off with a towel, or swimming. · Always wear a seat belt when traveling in a car. Always wear a helmet when riding a bicycle or motorcycle.

## 2021-03-29 NOTE — PROGRESS NOTES
Medicare Annual Wellness Visit  Name: Julian Murrell Date: 3/29/2021   MRN: <[id number]> Sex: Male   Age: 76 y.o. Ethnicity: Non-/Non    : 1945 Race: Pavel King is here for Medicare AWV and Congestion    Screenings for behavioral, psychosocial and functional/safety risks, and cognitive dysfunction are all negative except as indicated below. These results, as well as other patient data from the [address] form, are documented in Flowsheets linked to this Encounter. Allergies   Allergen Reactions    Statins      Other reaction(s): Pain  Pain in back of legs    Medrol [Methylprednisolone]     Morphine      Kidney stone    Quinine Sulfate [Quinine]          Prior to Visit Medications    Medication Sig Taking? Authorizing Provider   cefdinir (OMNICEF) 300 MG capsule Take 1 capsule by mouth 2 times daily for 10 days Yes LOBITO Woody CNP   famotidine (PEPCID) 20 MG tablet Take 1 tablet by mouth 2 times daily Yes LOBITO Woody CNP   hydrocortisone 2.5 % cream Apply topically 2 times daily Apply topically 2 times daily. Yes LOBITO Woody CNP   Turmeric 500 MG CAPS Take by mouth Yes Historical Provider, MD   Cholecalciferol (VITAMIN D3) 50 MCG (2000 UT) CAPS Take by mouth Yes Historical Provider, MD   amLODIPine-benazepril (LOTREL) 5-10 MG per capsule Take 1 capsule by mouth daily Yes Historical Provider, MD   Omega-3 Fatty Acids (FISH OIL) 1000 MG CAPS Take 3,000 mg by mouth 2 times daily Yes Historical Provider, MD   NONFORMULARY Take by mouth daily NERVE RENEW  Contains:  B-1, B-12 2000 mcg, R-Alpha Lopic Acid 150 mg, B-6 4 mg, Riboflavin 4 mg, Vitamin D 500 u, Benfodaime 300 mg, Proprietary Blend 43 mg Yes Historical Provider, MD   aspirin 81 MG chewable tablet Take 81 mg by mouth once a week  Yes Historical Provider, MD   gabapentin (NEURONTIN) 400 MG capsule 400 mg 2 times daily.   Yes Historical Provider, MD   Garlic 2936 MG CAPS Take by mouth Yes Historical Provider, MD   Red Yeast Rice Extract (RED YEAST RICE PO) Take by mouth once a week  Yes Historical Provider, MD   Coenzyme Q10 (CO Q-10) 50 MG CAPS Take 100 mg by mouth daily  Yes Historical Provider, MD   Multiple Vitamins-Minerals (CENTRUM SILVER PO) Take by mouth  Historical Provider, MD         Past Medical History:   Diagnosis Date    Cancer ([address]     prostate    Heart disease     HTN (hypertension)     Kidney stone        Past Surgical History:   Procedure Laterality Date    BACK SURGERY      fractures disc (Dr. Stacia Carmichael at [facility]) [place] in back   [address] LITHOTRIPSY Left 12/13/2017    [address]  2018    prostate cancer       History reviewed. No pertinent family history. CareTeam (Including outside providers/suppliers regularly involved in providing care):   Patient Care Team:  LOBITO Garcia CNP as PCP - General (Nurse Practitioner)  LOBITO Garcia CNP as PCP - [facility] Empaneled Provider    Wt Readings from Last 3 Encounters:   03/29/21 180 lb (81.6 kg)   11/11/20 178 lb (80.7 kg)   10/28/20 176 lb 6.4 oz (80 kg)     Vitals:    03/29/21 0800   BP: 126/74   Pulse: 68   Temp: 97.1 °F (36.2 °C)   SpO2: 96%   Weight: 180 lb (81.6 kg)   Height: 5' 10.25\" (1.784 m)     Sinusitis  This is a new problem. The current episode started more than 1 month ago. The problem has been waxing and waning since onset. There has been no fever. Associated symptoms include congestion, ear pain, sinus pressure and sneezing. Pertinent negatives include no chills, coughing, diaphoresis, headaches, neck pain, shortness of breath, sore throat or swollen glands. (States he gets congested at night.  ) Past treatments include antibiotics and oral decongestants (took 4 days of abx Dr. Haleigh Samuels gave him). The treatment provided mild relief.      Chronic: Prostate Cancer,  This is stable at this time per patient. He see's Dr. Sravani Bowie for this. Review of Systems   Constitutional: Negative for activity change, appetite change, chills, diaphoresis, fatigue, fever and unexpected weight change. HENT: Positive for congestion, ear pain, postnasal drip, rhinorrhea, sinus pressure, sinus pain and sneezing. Negative for ear discharge, hearing loss, nosebleeds, sore throat, tinnitus, trouble swallowing and voice change. Eyes: Negative for photophobia, pain, discharge, redness and itching. Respiratory: Negative for cough, choking, chest tightness, shortness of breath, wheezing and stridor. Cardiovascular: Negative for chest pain, palpitations and leg swelling. Gastrointestinal: Negative for abdominal pain, blood in stool, constipation, diarrhea, nausea and vomiting. Endocrine: Negative for cold intolerance, heat intolerance, polydipsia and polyuria. Genitourinary: Negative for difficulty urinating, dysuria, enuresis, flank pain, frequency, hematuria and urgency. Musculoskeletal: Negative for back pain, gait problem, joint swelling, neck pain and neck stiffness. Skin: Negative for color change, pallor, rash and wound. Allergic/Immunologic: Negative for environmental allergies and food allergies. Neurological: Negative for dizziness, tremors, syncope, speech difficulty, weakness, light-headedness, numbness and headaches. Hematological: Negative for adenopathy. Does not bruise/bleed easily. Psychiatric/Behavioral: Positive for behavioral problems. Negative for agitation, confusion, decreased concentration, dysphoric mood, hallucinations, self-injury, sleep disturbance and suicidal ideas. The patient is not nervous/anxious and is not hyperactive. Physical Exam  Vitals signs reviewed. Constitutional:       General: He is not in acute distress. Appearance: Normal appearance. He is well-developed. HENT:      Head: Normocephalic and atraumatic. Right Ear: Hearing, ear canal and external ear normal. There is impacted cerumen. Left Ear: Hearing, tympanic membrane, ear canal and external ear normal.      Nose: Congestion and rhinorrhea present. Right Sinus: Frontal sinus tenderness present. No maxillary sinus tenderness. Left Sinus: Frontal sinus tenderness present. No maxillary sinus tenderness. Mouth/Throat:      Pharynx: No oropharyngeal exudate. Eyes:      General:         Right eye: No discharge. Left eye: No discharge. Conjunctiva/sclera: Conjunctivae normal.      Pupils: Pupils are equal, round, and reactive to light. Neck:      Musculoskeletal: Normal range of motion. Thyroid: No thyromegaly. Vascular: No JVD. Trachea: No tracheal deviation. Cardiovascular:      Rate and Rhythm: Normal rate and regular rhythm. Heart sounds: Normal heart sounds. No murmur. No friction rub. Pulmonary:      Effort: Pulmonary effort is normal. No respiratory distress. Breath sounds: Normal breath sounds. No stridor. No decreased breath sounds, wheezing, rhonchi or rales. Musculoskeletal: Normal range of motion. General: No tenderness. Lymphadenopathy:      Cervical: No cervical adenopathy. Skin:     General: Skin is warm and dry. Capillary Refill: Capillary refill takes less than 2 seconds. Findings: No rash. Neurological:      Mental Status: He is alert and oriented to person, place, and time. Sensory: Sensation is intact. Motor: Motor function is intact. Coordination: Coordination normal.   Psychiatric:         Attention and Perception: Attention and perception normal.         Mood and Affect: Mood normal.         Speech: Speech normal.         Behavior: Behavior normal. Behavior is cooperative. Thought Content:  Thought content normal.         Cognition and Memory: Cognition normal.         Judgment: Judgment normal.       Body mass index is 25.64 kg/m². Based upon direct observation of the patient, evaluation of cognition reveals recent and remote memory intact. Patient's complete Health Risk Assessment and screening values have been reviewed and are found in Flowsheets. The following problems were reviewed today and where indicated follow up appointments were made and/or referrals ordered. Positive Risk Factor Screenings with Interventions:            General Health and ACP:  General  In general, how would you say your health is?: Fair  In the past 7 days, have you experienced any of the following? New or Increased Pain, New or Increased Fatigue, Loneliness, Social Isolation, Stress or Anger?: (!) Social Isolation, Stress  Do you get the social and emotional support that you need?: Yes  Do you have a Living Will?: Yes  Advance Directives     Power of [facility] Will ACP-Advance Directive ACP-Power of     Not on File Not on File Not on File Not on File      General Health Risk Interventions:  · Social isolation: hard to stay home since retiring, hard to keep up with wife. · Stress: he states his wife works constantly and he cant keep up       ADL:  ADLs  In the past 7 days, did you need help from others to perform any of the following everyday activities? Eating, dressing, grooming, bathing, toileting, or walking/balance?: None  In the past 7 days, did you need help from others to take care of any of the following?  Laundry, housekeeping, banking/finances, shopping, telephone use, food preparation, transportation, or taking medications?: (!) Housekeeping, Food Preparation  ADL Interventions:  · Stess taking care of mother in law    Personalized Preventive Plan   Current Health Maintenance Status  Immunization History   Administered Date(s) Administered    Pneumococcal Polysaccharide ([id number]) 12/18/2013    Tdap (Boostrix, Adacel) 02/06/2020    Zoster Recombinant (Shingrix) 02/06/2020        Health Maintenance   Topic Date Due   

## 2021-05-07 ENCOUNTER — OFFICE VISIT (OUTPATIENT)
Dept: FAMILY MEDICINE CLINIC | Age: 76
End: 2021-05-07
Payer: MEDICARE

## 2021-05-07 VITALS
HEART RATE: 57 BPM | OXYGEN SATURATION: 96 % | BODY MASS INDEX: 25.79 KG/M2 | SYSTOLIC BLOOD PRESSURE: 122 MMHG | WEIGHT: 181 LBS | DIASTOLIC BLOOD PRESSURE: 74 MMHG

## 2021-05-07 DIAGNOSIS — R22.9 SKIN NODULE: Primary | ICD-10-CM

## 2021-05-07 PROCEDURE — 99213 OFFICE O/P EST LOW 20 MIN: CPT | Performed by: NURSE PRACTITIONER

## 2021-05-07 PROCEDURE — 1036F TOBACCO NON-USER: CPT | Performed by: NURSE PRACTITIONER

## 2021-05-07 PROCEDURE — 3017F COLORECTAL CA SCREEN DOC REV: CPT | Performed by: NURSE PRACTITIONER

## 2021-05-07 PROCEDURE — G8427 DOCREV CUR MEDS BY ELIG CLIN: HCPCS | Performed by: NURSE PRACTITIONER

## 2021-05-07 PROCEDURE — 1123F ACP DISCUSS/DSCN MKR DOCD: CPT | Performed by: NURSE PRACTITIONER

## 2021-05-07 PROCEDURE — G8417 CALC BMI ABV UP PARAM F/U: HCPCS | Performed by: NURSE PRACTITIONER

## 2021-05-07 PROCEDURE — 4040F PNEUMOC VAC/ADMIN/RCVD: CPT | Performed by: NURSE PRACTITIONER

## 2021-05-07 RX ORDER — SULFAMETHOXAZOLE AND TRIMETHOPRIM 800; 160 MG/1; MG/1
1 TABLET ORAL 2 TIMES DAILY
Qty: 14 TABLET | Refills: 0 | Status: SHIPPED | OUTPATIENT
Start: 2021-05-07 | End: 2021-05-14

## 2021-05-07 ASSESSMENT — ENCOUNTER SYMPTOMS
TROUBLE SWALLOWING: 0
PHOTOPHOBIA: 0
EYE PAIN: 0
VOMITING: 0
CHOKING: 0
VOICE CHANGE: 0
STRIDOR: 0
CONSTIPATION: 0
BACK PAIN: 0
CHEST TIGHTNESS: 0
SORE THROAT: 0
EYE REDNESS: 0
EYE ITCHING: 0
WHEEZING: 0
DIARRHEA: 0
COUGH: 0
BLOOD IN STOOL: 0
ABDOMINAL PAIN: 0
SINUS PAIN: 0
EYE DISCHARGE: 0
COLOR CHANGE: 1
RHINORRHEA: 0
SHORTNESS OF BREATH: 0
NAUSEA: 0
SINUS PRESSURE: 0

## 2021-05-07 NOTE — PROGRESS NOTES
Chief Complaint   Patient presents with    Cyst     knot under left armpit        /74   Pulse 57   Wt 181 lb (82.1 kg)   SpO2 96%   BMI 25.79 kg/m²     HPI:  Tho Wu is a 76 y.o. (: 1945) here today   for   HPI    Patient's medications, allergies, past medical, surgical, social and family histories were reviewed and updated as appropriate. Left armpit nodule:    He noticed a knot under his left armpit a few days ago. He has a hx of limes disease. He has a fear of getting this again. Denies fever and chills. ROS:  Review of Systems   Constitutional: Negative for activity change, appetite change, chills, diaphoresis, fatigue, fever and unexpected weight change. HENT: Negative for congestion, ear discharge, ear pain, hearing loss, nosebleeds, postnasal drip, rhinorrhea, sinus pressure, sinus pain, sneezing, sore throat, tinnitus, trouble swallowing and voice change. Eyes: Negative for photophobia, pain, discharge, redness and itching. Respiratory: Negative for cough, choking, chest tightness, shortness of breath, wheezing and stridor. Cardiovascular: Negative for chest pain, palpitations and leg swelling. Gastrointestinal: Negative for abdominal pain, blood in stool, constipation, diarrhea, nausea and vomiting. Endocrine: Negative for cold intolerance, heat intolerance, polydipsia and polyuria. Genitourinary: Negative for difficulty urinating, dysuria, enuresis, flank pain, frequency, hematuria and urgency. Musculoskeletal: Negative for back pain, gait problem, joint swelling, neck pain and neck stiffness. Skin: Positive for color change and wound. Negative for pallor and rash. Allergic/Immunologic: Negative for environmental allergies and food allergies. Neurological: Negative for dizziness, tremors, syncope, speech difficulty, weakness, light-headedness, numbness and headaches. Hematological: Negative for adenopathy. Does not bruise/bleed easily. Psychiatric/Behavioral: Negative for agitation, behavioral problems, confusion, decreased concentration, dysphoric mood, hallucinations, self-injury, sleep disturbance and suicidal ideas. The patient is not nervous/anxious and is not hyperactive. Prior to Visit Medications    Medication Sig Taking? Authorizing Provider   sulfamethoxazole-trimethoprim (BACTRIM DS;SEPTRA DS) 800-160 MG per tablet Take 1 tablet by mouth 2 times daily for 7 days Yes LOBITO Jason CNP   famotidine (PEPCID) 20 MG tablet Take 1 tablet by mouth 2 times daily Yes LOBITO Jason CNP   hydrocortisone 2.5 % cream Apply topically 2 times daily Apply topically 2 times daily. Yes LOBITO Jason CNP   Turmeric 500 MG CAPS Take by mouth Yes Historical Provider, MD   Cholecalciferol (VITAMIN D3) 50 MCG (2000 UT) CAPS Take by mouth Yes Historical Provider, MD   amLODIPine-benazepril (LOTREL) 5-10 MG per capsule Take 1 capsule by mouth daily Yes Historical Provider, MD   Omega-3 Fatty Acids (FISH OIL) 1000 MG CAPS Take 3,000 mg by mouth 2 times daily Yes Historical Provider, MD   NONFORMULARY Take by mouth daily NERVE RENEW  Contains:  B-1, B-12 2000 mcg, R-Alpha Lopic Acid 150 mg, B-6 4 mg, Riboflavin 4 mg, Vitamin D 500 u, Benfodaime 300 mg, Proprietary Blend 43 mg Yes Historical Provider, MD   aspirin 81 MG chewable tablet Take 81 mg by mouth once a week  Yes Historical Provider, MD   gabapentin (NEURONTIN) 400 MG capsule 400 mg 2 times daily.   Yes Historical Provider, MD   Multiple Vitamins-Minerals (CENTRUM SILVER PO) Take by mouth Yes Historical Provider, MD   Garlic 4650 MG CAPS Take by mouth Yes Historical Provider, MD   Red Yeast Rice Extract (RED YEAST RICE PO) Take by mouth once a week  Yes Historical Provider, MD   Coenzyme Q10 (CO Q-10) 50 MG CAPS Take 100 mg by mouth daily  Yes Historical Provider, MD       Allergies   Allergen Reactions    Statins      Other reaction(s): Pain  Pain in back of legs    Medrol [Methylprednisolone]     Morphine      Kidney stone    Quinine Sulfate [Quinine]        OBJECTIVE:      BP Readings from Last 2 Encounters:   05/07/21 122/74   03/29/21 126/74       Wt Readings from Last 3 Encounters:   05/07/21 181 lb (82.1 kg)   03/29/21 180 lb (81.6 kg)   11/11/20 178 lb (80.7 kg)       Physical Exam  Vitals signs reviewed. Constitutional:       General: He is not in acute distress. Appearance: Normal appearance. He is well-developed. HENT:      Head: Normocephalic and atraumatic. Right Ear: Hearing and external ear normal.      Left Ear: Hearing and external ear normal.      Nose: Nose normal.      Right Sinus: No maxillary sinus tenderness or frontal sinus tenderness. Left Sinus: No maxillary sinus tenderness or frontal sinus tenderness. Mouth/Throat:      Pharynx: No oropharyngeal exudate. Eyes:      General:         Right eye: No discharge. Left eye: No discharge. Conjunctiva/sclera: Conjunctivae normal.      Pupils: Pupils are equal, round, and reactive to light. Neck:      Musculoskeletal: Normal range of motion. Thyroid: No thyromegaly. Vascular: No JVD. Trachea: No tracheal deviation. Cardiovascular:      Rate and Rhythm: Normal rate and regular rhythm. Heart sounds: Normal heart sounds. No murmur. No friction rub. Pulmonary:      Effort: Pulmonary effort is normal. No respiratory distress. Breath sounds: Normal breath sounds. No stridor. No decreased breath sounds, wheezing, rhonchi or rales. Abdominal:      General: Bowel sounds are normal. There is no distension. Palpations: Abdomen is soft. There is no mass. Tenderness: There is no abdominal tenderness. There is no guarding or rebound. Musculoskeletal: Normal range of motion. General: No tenderness. Lymphadenopathy:      Cervical: No cervical adenopathy. Skin:     General: Skin is warm and dry.       Capillary Refill: Capillary refill takes less than 2 seconds. Findings: Erythema and lesion present. No rash. Neurological:      Mental Status: He is alert and oriented to person, place, and time. Sensory: Sensation is intact. Motor: Motor function is intact. Coordination: Coordination normal.   Psychiatric:         Attention and Perception: Attention and perception normal.         Mood and Affect: Mood normal.         Speech: Speech normal.         Behavior: Behavior normal. Behavior is cooperative. Thought Content: Thought content normal.         Cognition and Memory: Cognition normal.         Judgment: Judgment normal.           ASSESSMENT/PLAN:    1. Skin nodule    - Marisol Craig MD, General Surgery, [facility]  - sulfamethoxazole-trimethoprim (BACTRIM DS;SEPTRA DS) 800-160 MG per tablet; Take 1 tablet by mouth 2 times daily for 7 days  Dispense: 14 tablet; Refill: 0    Follow up if symptoms do not improve or worsen. If the patient becomes short of breath go straight to the ER or call 911.

## 2021-12-17 ENCOUNTER — TELEPHONE (OUTPATIENT)
Dept: FAMILY MEDICINE CLINIC | Age: 76
End: 2021-12-17

## 2021-12-17 RX ORDER — VALACYCLOVIR HYDROCHLORIDE 1 G/1
1000 TABLET, FILM COATED ORAL EVERY 8 HOURS
Qty: 21 TABLET | Refills: 0 | Status: SHIPPED | OUTPATIENT
Start: 2021-12-17 | End: 2021-12-24

## 2021-12-17 NOTE — TELEPHONE ENCOUNTER
Pt wife called in stating patient has shingles on his back. Blisters about 1 week. Wanting to know if you will call in a cream. He is covid positive. She also wanted to schedule him for blood work d/t issues with fatigue.  Has had shingles vaccine

## 2021-12-17 NOTE — TELEPHONE ENCOUNTER
Rec valtrex 1 gm po q8h x 7d. Once over covid, can arrange f/u appt so we can decide on appropriate labs.

## 2021-12-29 ENCOUNTER — OFFICE VISIT (OUTPATIENT)
Dept: FAMILY MEDICINE CLINIC | Age: 76
End: 2021-12-29
Payer: MEDICARE

## 2021-12-29 VITALS
WEIGHT: 175 LBS | BODY MASS INDEX: 25.05 KG/M2 | HEART RATE: 96 BPM | HEIGHT: 70 IN | OXYGEN SATURATION: 96 % | SYSTOLIC BLOOD PRESSURE: 120 MMHG | DIASTOLIC BLOOD PRESSURE: 72 MMHG

## 2021-12-29 DIAGNOSIS — U07.1 COVID-19 VIRUS INFECTION: Primary | ICD-10-CM

## 2021-12-29 DIAGNOSIS — R14.0 ABDOMINAL BLOATING: ICD-10-CM

## 2021-12-29 DIAGNOSIS — R53.83 OTHER FATIGUE: ICD-10-CM

## 2021-12-29 DIAGNOSIS — Z85.46 H/O PROSTATE CANCER: ICD-10-CM

## 2021-12-29 DIAGNOSIS — I10 PRIMARY HYPERTENSION: ICD-10-CM

## 2021-12-29 PROCEDURE — G8420 CALC BMI NORM PARAMETERS: HCPCS | Performed by: FAMILY MEDICINE

## 2021-12-29 PROCEDURE — 4040F PNEUMOC VAC/ADMIN/RCVD: CPT | Performed by: FAMILY MEDICINE

## 2021-12-29 PROCEDURE — 1123F ACP DISCUSS/DSCN MKR DOCD: CPT | Performed by: FAMILY MEDICINE

## 2021-12-29 PROCEDURE — 1036F TOBACCO NON-USER: CPT | Performed by: FAMILY MEDICINE

## 2021-12-29 PROCEDURE — G8427 DOCREV CUR MEDS BY ELIG CLIN: HCPCS | Performed by: FAMILY MEDICINE

## 2021-12-29 PROCEDURE — 99214 OFFICE O/P EST MOD 30 MIN: CPT | Performed by: FAMILY MEDICINE

## 2021-12-29 PROCEDURE — G8484 FLU IMMUNIZE NO ADMIN: HCPCS | Performed by: FAMILY MEDICINE

## 2021-12-29 ASSESSMENT — ENCOUNTER SYMPTOMS
SHORTNESS OF BREATH: 1
ABDOMINAL DISTENTION: 1

## 2021-12-29 NOTE — PROGRESS NOTES
Not on file    Highest education level: Not on file   Occupational History    Not on file   Tobacco Use    Smoking status: Former Smoker     Packs/day: 0.50     Years: 3.00     Pack years: 1.50     Types: Cigarettes     Quit date: 1970     Years since quittin.0    Smokeless tobacco: Former User   Vaping Use    Vaping Use: Never used   Substance and Sexual Activity    Alcohol use: Never    Drug use: Never    Sexual activity: Not Currently   Other Topics Concern    Not on file   Social History Narrative    Not on file     Social Determinants of Health     Financial Resource Strain:     Difficulty of Paying Living Expenses: Not on file   Food Insecurity:     Worried About [address] in the Last Year: Not on file    [address] [place] in the Last Year: Not on file   Transportation Needs:     Lack of Transportation (Medical): Not on file    Lack of Transportation (Non-Medical): Not on file   Physical Activity:     Days of Exercise per Week: Not on file    Minutes of Exercise per Session: Not on file   Stress:     Feeling of Stress : Not on file   Social Connections:     Frequency of Communication with Friends and Family: Not on file    Frequency of Social Gatherings with Friends and Family: Not on file    Attends Faith Services: Not on file    Active Member of [address] [place] [address] or Organizations: Not on file    Attends Club or Organization Meetings: Not on file    Marital Status: Not on file   Intimate Partner Violence:     Fear of Current or Ex-Partner: Not on file    Emotionally Abused: Not on file    Physically Abused: Not on file    Sexually Abused: Not on file   Housing Stability:     Unable to Pay for Housing in the Last Year: Not on file    Number of [place] in the Last Year: Not on file    Unstable Housing in the Last Year: Not on file       Prior to Visit Medications    Medication Sig Taking?  Authorizing Provider   Cyanocobalamin (VITAMIN B-12 PO) Take by mouth Yes Historical Provider, MD   Turmeric 500 MG CAPS Take by mouth Yes Historical Provider, MD   Cholecalciferol (VITAMIN D3) 50 MCG (2000 UT) CAPS Take by mouth Yes Historical Provider, MD   amLODIPine-benazepril (LOTREL) 5-10 MG per capsule Take 1 capsule by mouth daily Yes Historical Provider, MD   Omega-3 Fatty Acids (FISH OIL) 1000 MG CAPS Take 3,000 mg by mouth 2 times daily Yes Historical Provider, MD   NONFORMULARY Take by mouth daily NERVE RENEW  Contains:  B-1, B-12 2000 mcg, R-Alpha Lopic Acid 150 mg, B-6 4 mg, Riboflavin 4 mg, Vitamin D 500 u, Benfodaime 300 mg, Proprietary Blend 43 mg Yes Historical Provider, MD   aspirin 81 MG chewable tablet Take 81 mg by mouth once a week  Yes Historical Provider, MD   gabapentin (NEURONTIN) 400 MG capsule 400 mg 2 times daily. Yes Historical Provider, MD   Multiple Vitamins-Minerals (CENTRUM SILVER PO) Take by mouth Yes Historical Provider, MD   Garlic 5314 MG CAPS Take by mouth Yes Historical Provider, MD   Red Yeast Rice Extract (RED YEAST RICE PO) Take by mouth once a week  Yes Historical Provider, MD   Coenzyme Q10 (CO Q-10) 50 MG CAPS Take 100 mg by mouth daily  Yes Historical Provider, MD   hydrocortisone 2.5 % cream Apply topically 2 times daily Apply topically 2 times daily. LOBITO Forrest - CNP       Allergies   Allergen Reactions    Statins      Other reaction(s): Pain  Pain in back of legs    Medrol [Methylprednisolone]     Morphine      Kidney stone    Quinine Sulfate [Quinine]        OBJECTIVE:    /72   Pulse 96   Ht 5' 10.25\" (1.784 m)   Wt 175 lb (79.4 kg)   SpO2 96%   BMI 24.93 kg/m²     BP Readings from Last 2 Encounters:   12/29/21 120/72   05/07/21 122/74       Wt Readings from Last 3 Encounters:   12/29/21 175 lb (79.4 kg)   05/07/21 181 lb (82.1 kg)   03/29/21 180 lb (81.6 kg)       Physical Exam  Constitutional:       Appearance: Normal appearance. HENT:      Head: Normocephalic and atraumatic.    Eyes:      Extraocular Movements: Extraocular movements intact. Cardiovascular:      Rate and Rhythm: Normal rate and regular rhythm. Pulmonary:      Effort: Pulmonary effort is normal.      Breath sounds: Normal breath sounds. Abdominal:      Palpations: Abdomen is soft. Tenderness: There is no abdominal tenderness. Musculoskeletal:      Right lower leg: No edema. Left lower leg: No edema. Neurological:      General: No focal deficit present. Mental Status: He is alert and oriented to person, place, and time. Psychiatric:         Mood and Affect: Mood normal.         Behavior: Behavior normal.           ASSESSMENT/PLAN:    1. COVID-19 virus infection  Patient has history of Covid infection in early December. He is overall significantly better, but still having some symptoms of decreased exercise tolerance and rapid heart rate with activity. He was back to the emergency department for concerns for chest pain. Troponin, CBC, BMP negative. Chest x-ray still showed some interstitial findings consistent with residual issues related to recent Covid. Patient states he is overall improving. Wife had some concerns regarding need for potential stress test as it has been 3 years since his last stress test.  Stress test was negative. I think some of his current symptoms are most likely to recovering from Covid. Consider back to cardiology or additional work-up if severe symptoms. Otherwise, follow-up in 6 to 8 weeks. He has been having some fatigue as well, which also could be Covid related. Wife inquired about possible hormone labs. Again, I would wait to see how his symptoms progress and then decide what labs are necessary. He just had CBC and electrolytes done in the hospital approximately 1 week ago. 2. Primary hypertension  Blood pressure well controlled. Continue medications    3. Other fatigue  Seems to be improving. Continue to slowly advance activity.   Consider labs at next appointment if fails to continue to improve    4. H/O prostate cancer  Follow-up with urology    5. Abdominal bloating  Continue probiotic. Discussed adding IBgard. Consider further evaluation if ongoing symptoms          This document was prepared by a combination of typing and transcription through a voice recognition software.

## 2021-12-29 NOTE — PATIENT INSTRUCTIONS
Chicho Greene for stomach issues. Cont to work on endurance. F/u in 6-8 weeks. Consider rpt labs and /or chest xray at that time.

## 2022-06-09 ENCOUNTER — OFFICE VISIT (OUTPATIENT)
Dept: FAMILY MEDICINE CLINIC | Age: 77
End: 2022-06-09
Payer: MEDICARE

## 2022-06-09 VITALS
DIASTOLIC BLOOD PRESSURE: 82 MMHG | OXYGEN SATURATION: 95 % | BODY MASS INDEX: 25.5 KG/M2 | WEIGHT: 179 LBS | HEART RATE: 60 BPM | SYSTOLIC BLOOD PRESSURE: 142 MMHG

## 2022-06-09 DIAGNOSIS — Z01.818 PREOP EXAMINATION: Primary | ICD-10-CM

## 2022-06-09 DIAGNOSIS — R31.9 HEMATURIA, UNSPECIFIED TYPE: ICD-10-CM

## 2022-06-09 DIAGNOSIS — N20.0 KIDNEY STONE: ICD-10-CM

## 2022-06-09 PROBLEM — C61 PRIMARY MALIGNANT NEOPLASM OF PROSTATE (HCC): Status: RESOLVED | Noted: 2021-03-29 | Resolved: 2022-06-09

## 2022-06-09 LAB
BILIRUBIN, POC: NEGATIVE
BLOOD URINE, POC: NEGATIVE
CLARITY, POC: NORMAL
COLOR, POC: YELLOW
GLUCOSE URINE, POC: NEGATIVE
KETONES, POC: NEGATIVE
LEUKOCYTE EST, POC: NEGATIVE
NITRITE, POC: NEGATIVE
PH, POC: 7
PROTEIN, POC: NEGATIVE
SPECIFIC GRAVITY, POC: 1.02
UROBILINOGEN, POC: 0.2

## 2022-06-09 PROCEDURE — 99214 OFFICE O/P EST MOD 30 MIN: CPT

## 2022-06-09 PROCEDURE — 81002 URINALYSIS NONAUTO W/O SCOPE: CPT

## 2022-06-09 PROCEDURE — G8427 DOCREV CUR MEDS BY ELIG CLIN: HCPCS

## 2022-06-09 PROCEDURE — G8417 CALC BMI ABV UP PARAM F/U: HCPCS

## 2022-06-09 PROCEDURE — 93000 ELECTROCARDIOGRAM COMPLETE: CPT

## 2022-06-09 PROCEDURE — 1036F TOBACCO NON-USER: CPT

## 2022-06-09 PROCEDURE — 1123F ACP DISCUSS/DSCN MKR DOCD: CPT

## 2022-06-09 ASSESSMENT — PATIENT HEALTH QUESTIONNAIRE - PHQ9
SUM OF ALL RESPONSES TO PHQ QUESTIONS 1-9: 0
2. FEELING DOWN, DEPRESSED OR HOPELESS: 0
1. LITTLE INTEREST OR PLEASURE IN DOING THINGS: 0
SUM OF ALL RESPONSES TO PHQ QUESTIONS 1-9: 0
SUM OF ALL RESPONSES TO PHQ9 QUESTIONS 1 & 2: 0

## 2022-06-09 ASSESSMENT — ENCOUNTER SYMPTOMS
EYE PAIN: 0
ABDOMINAL PAIN: 0
COLOR CHANGE: 0
COUGH: 0
RHINORRHEA: 0
EYE DISCHARGE: 0
CHEST TIGHTNESS: 0
SHORTNESS OF BREATH: 0
DIARRHEA: 0
CHOKING: 0
WHEEZING: 0
TROUBLE SWALLOWING: 0
SORE THROAT: 0
CONSTIPATION: 0
ABDOMINAL DISTENTION: 0

## 2022-06-09 NOTE — PROGRESS NOTES
Piper Levy  YOB: 1945    Date of Service:  6/9/2022      Wt Readings from Last 2 Encounters:   06/09/22 179 lb (81.2 kg)   12/29/21 175 lb (79.4 kg)       BP Readings from Last 3 Encounters:   06/09/22 (!) 142/82 pt did not take BP meds this AM   12/29/21 120/72   05/07/21 122/74        Chief Complaint   [facility]   [facility]     Lithotripsy by Dr Sloane Rosario on 6/15/22 at [facility]        Allergies   Allergen Reactions    Statins      Other reaction(s): Pain  Pain in back of legs    Medrol [Methylprednisolone]     Morphine      Kidney stone    Quinine Sulfate [Quinine]        Outpatient Medications Marked as Taking for the 6/9/22 encounter (Office Visit) with LOBITO Burton CNP   Medication Sig Dispense Refill    amLODIPine-benazepril (LOTREL) 5-10 MG per capsule Take 1 capsule by mouth daily         This patient presents to the office today for a preoperative consultation  at the request of surgeon, Dr. Lucy Gomez, who plans on performing Lithotripsy on June 15th at [facility]. Planned anesthesia: General   Known anesthesia problems: None   Bleeding risk: Anticoagulant therapy- takes ASA 81mg daily  Personal or FH of DVT/PE: No    Patient objection to receiving blood products: No    Past Medical History:   Diagnosis Date    Heart disease     History of prostate cancer     HTN (hypertension)     Kidney stone        Past Surgical History:   Procedure Laterality Date    BACK SURGERY      fractures disc (Dr. Alvina Mccormack at [facility]) [place] in back   [address] LITHOTRIPSY Left 12/13/2017    [place] [address]  2018    prostate cancer       History reviewed. No pertinent family history.     Social History     Socioeconomic History    Marital status:      Spouse name: Not on file    Number of children: Not on file    Years of education: Not on file    Highest education level: Not on file   Occupational History    Not on file   Tobacco Use    Smoking status: Former Smoker     Packs/day: 0.50     Years: 3.00     Pack years: 1.50     Types: Cigarettes     Quit date: 1970     Years since quittin.4    Smokeless tobacco: Former User   Vaping Use    Vaping Use: Never used   Substance and Sexual Activity    Alcohol use: Never    Drug use: Never    Sexual activity: Not Currently   Other Topics Concern    Not on file   Social History Narrative    Not on file     Social Determinants of Health     Financial Resource Strain:     Difficulty of Paying Living Expenses: Not on file   Food Insecurity:     Worried About [address] in the Last Year: Not on file    [address] [employer] in the Last Year: Not on file   Transportation Needs:     Lack of Transportation (Medical): Not on file    Lack of Transportation (Non-Medical):  Not on file   Physical Activity:     Days of Exercise per Week: Not on file    Minutes of Exercise per Session: Not on file   Stress:     Feeling of Stress : Not on file   Social Connections:     Frequency of Communication with Friends and Family: Not on file    Frequency of Social Gatherings with Friends and Family: Not on file    Attends Confucianism Services: Not on file    Active Member of [address] [place] [address] or Organizations: Not on file    Attends Club or Organization Meetings: Not on file    Marital Status: Not on file   Intimate Partner Violence:     Fear of Current or Ex-Partner: Not on file    Emotionally Abused: Not on file    Physically Abused: Not on file    Sexually Abused: Not on file   Housing Stability:     Unable to Pay for Housing in the Last Year: Not on file    Number of [place] in the Last Year: Not on file    Unstable Housing in the Last Year: Not on file       Review of Systems  Review of Systems   Constitutional: Negative for activity change, appetite change, chills, fatigue, fever and unexpected weight change. HENT: Negative for rhinorrhea, sore throat and trouble swallowing. Eyes: Negative for pain, discharge and visual disturbance. Respiratory: Negative for cough, choking, chest tightness, shortness of breath and wheezing. Cardiovascular: Negative for chest pain, palpitations and leg swelling. Gastrointestinal: Negative for abdominal distention, abdominal pain, constipation and diarrhea. Endocrine: Negative for cold intolerance and heat intolerance. Genitourinary: Positive for flank pain and hematuria. Negative for difficulty urinating. Musculoskeletal: Negative for gait problem and neck stiffness. Skin: Negative for color change and rash. Neurological: Negative for dizziness, weakness and headaches. Psychiatric/Behavioral: Negative for dysphoric mood and sleep disturbance. Vitals:    06/09/22 1120 06/09/22 1126 06/09/22 1147   BP: (!) 162/80 (!) 156/82 (!) 142/82   Site:   Right Upper Arm   Position:   Sitting   Pulse: 60  60   SpO2: 95%     Weight: 179 lb (81.2 kg)          Physical Exam   Physical Exam  Constitutional:       Appearance: Normal appearance. HENT:      Head: Normocephalic and atraumatic. Right Ear: External ear normal.      Left Ear: External ear normal.      Nose: Nose normal. No congestion. Mouth/Throat:      Mouth: Mucous membranes are moist.      Pharynx: Oropharynx is clear. Eyes:      Extraocular Movements: Extraocular movements intact. Pupils: Pupils are equal, round, and reactive to light. Cardiovascular:      Rate and Rhythm: Normal rate and regular rhythm. Pulses: Normal pulses. Heart sounds: Normal heart sounds. No murmur heard. Pulmonary:      Effort: Pulmonary effort is normal. No respiratory distress. Breath sounds: Normal breath sounds. No wheezing. Abdominal:      General: Bowel sounds are normal.      Palpations: Abdomen is soft. Tenderness:  There is no abdominal tenderness. Musculoskeletal:         General: Normal range of motion. Cervical back: Normal range of motion and neck supple. Right lower leg: No edema. Left lower leg: No edema. Skin:     General: Skin is warm and dry. Capillary Refill: Capillary refill takes less than 2 seconds. Findings: No rash. Neurological:      General: No focal deficit present. Mental Status: He is alert and oriented to person, place, and time. Motor: No weakness. Psychiatric:         Mood and Affect: Mood normal.         Behavior: Behavior normal.         EKG Interpretation:  Sinus Rhythm with occ ectopic Ventricular beat. ([id number] for sugery). Lab Review: will draw CMP in office today. Assessment:       68 y.o. patient with planned surgery as above. Known risk factors for perioperative complications: Hypertension, Heart Disease   Current medications which may produce withdrawal symptoms if withheld perioperatively: none    Diagnosis Orders   1. Preop examination  EKG 12 lead    Comprehensive Metabolic Panel    Basic Metabolic Panel   2. Kidney stone     3. Hematuria, unspecified type  POCT Urinalysis no Micro          Plan:     1. Preoperative workup as follows:EKG, CMP  2. Change in medication regimen before surgery: Hold all medications on morning of surgery, hold ASA, Co-Q10, Turmeric, Fish Oil, Red Yeast Rice x7 days.    3. Prophylaxis forcardiac events with perioperative beta-blockers: Not indicated  ACC/AHA indications for pre-operative beta-blocker use:    · Vascular surgery with history of postitive stress test  · Intermediate or high risk surgery with history of CAD   · Intermediate or high risk surgery with multiple clinical predictors of CAD- 2 of the following: history of compensated or prior heart failure, history of cerebrovascular disease, DM, or renal insufficiency    Routine administration of higher-dose, long-acting metoprolol in beta-blocker-naïve patients on the day of surgery, and in the absence of dose titration is with an overall increase in mortality. Beta-blockers should be started days to weeks prior to surgery and titrated to pulse < 70.  4. Deep vein thrombosis prophylaxis: regimen to be chosen by surgical team  5. No contraindications to planned surgery. Patient okay to proceed to operating room from medical standpoint. Please fax H&P with all preop testing to appropriate location. This document was prepared by a combination of typing and transcription through a voice recognition software.     LOBITO Trent - CNP

## 2022-06-10 LAB
A/G RATIO: 1.5 (ref 1.1–2.2)
ALBUMIN SERPL-MCNC: 4.6 G/DL (ref 3.4–5)
ALP BLD-CCNC: 67 U/L (ref 40–129)
ALT SERPL-CCNC: 20 U/L (ref 10–40)
ANION GAP SERPL CALCULATED.3IONS-SCNC: 12 MMOL/L (ref 3–16)
AST SERPL-CCNC: 23 U/L (ref 15–37)
BILIRUB SERPL-MCNC: 0.9 MG/DL (ref 0–1)
BUN BLDV-MCNC: 16 MG/DL (ref 7–20)
CALCIUM SERPL-MCNC: 9.4 MG/DL (ref 8.3–10.6)
CHLORIDE BLD-SCNC: 103 MMOL/L (ref 99–110)
CO2: 25 MMOL/L (ref 21–32)
CREAT SERPL-MCNC: 0.9 MG/DL (ref 0.8–1.3)
GFR AFRICAN AMERICAN: >60
GFR NON-AFRICAN AMERICAN: >60
GLUCOSE BLD-MCNC: 90 MG/DL (ref 70–99)
POTASSIUM SERPL-SCNC: 4.1 MMOL/L (ref 3.5–5.1)
SODIUM BLD-SCNC: 140 MMOL/L (ref 136–145)
TOTAL PROTEIN: 7.6 G/DL (ref 6.4–8.2)

## 2022-06-15 ENCOUNTER — HOSPITAL ENCOUNTER (OUTPATIENT)
Age: 77
Setting detail: SPECIMEN
Discharge: HOME | End: 2022-06-15

## 2022-06-15 PROCEDURE — 82360 CALCULUS ASSAY QUANT: CPT

## 2022-06-22 LAB
STONE COMPOSITION: NORMAL
STONE DESCRIPTION: NORMAL
STONE MASS: 57 MG

## 2022-06-30 ENCOUNTER — OFFICE VISIT (OUTPATIENT)
Dept: FAMILY MEDICINE CLINIC | Age: 77
End: 2022-06-30
Payer: MEDICARE

## 2022-06-30 VITALS
HEART RATE: 77 BPM | DIASTOLIC BLOOD PRESSURE: 78 MMHG | WEIGHT: 178 LBS | BODY MASS INDEX: 25.48 KG/M2 | SYSTOLIC BLOOD PRESSURE: 120 MMHG | OXYGEN SATURATION: 97 % | HEIGHT: 70 IN

## 2022-06-30 DIAGNOSIS — Z00.00 MEDICARE ANNUAL WELLNESS VISIT, SUBSEQUENT: Primary | ICD-10-CM

## 2022-06-30 PROCEDURE — G0439 PPPS, SUBSEQ VISIT: HCPCS

## 2022-06-30 PROCEDURE — 1123F ACP DISCUSS/DSCN MKR DOCD: CPT

## 2022-06-30 ASSESSMENT — PATIENT HEALTH QUESTIONNAIRE - PHQ9
SUM OF ALL RESPONSES TO PHQ QUESTIONS 1-9: 0
SUM OF ALL RESPONSES TO PHQ QUESTIONS 1-9: 0
2. FEELING DOWN, DEPRESSED OR HOPELESS: 0
SUM OF ALL RESPONSES TO PHQ QUESTIONS 1-9: 0
1. LITTLE INTEREST OR PLEASURE IN DOING THINGS: 0
SUM OF ALL RESPONSES TO PHQ9 QUESTIONS 1 & 2: 0
SUM OF ALL RESPONSES TO PHQ QUESTIONS 1-9: 0

## 2022-06-30 ASSESSMENT — LIFESTYLE VARIABLES: HOW OFTEN DO YOU HAVE A DRINK CONTAINING ALCOHOL: NEVER

## 2022-06-30 NOTE — PATIENT INSTRUCTIONS
Personalized Preventive Plan for Barrett Johnson - 6/30/2022  Medicare offers a range of preventive health benefits. Some of the tests and screenings are paid in full while other may be subject to a deductible, co-insurance, and/or copay. Some of these benefits include a comprehensive review of your medical history including lifestyle, illnesses that may run in your family, and various assessments and screenings as appropriate. After reviewing your medical record and screening and assessments performed today your provider may have ordered immunizations, labs, imaging, and/or referrals for you. A list of these orders (if applicable) as well as your Preventive Care list are included within your After Visit Summary for your review. Other Preventive Recommendations:    · A preventive eye exam performed by an eye specialist is recommended every 1-2 years to screen for glaucoma; cataracts, macular degeneration, and other eye disorders. · A preventive dental visit is recommended every 6 months. · Try to get at least 150 minutes of exercise per week or 10,000 steps per day on a pedometer . · Order or download the FREE \"Exercise & Physical Activity: Your Everyday Guide\" from [employer]. Call 1-185.321.8637 or search [employer] online. · You need 6488-1172 mg of calcium and 0597-2420 IU of vitamin D per day. It is possible to meet your calcium requirement with diet alone, but a vitamin D supplement is usually necessary to meet this goal.  · When exposed to the sun, use a sunscreen that protects against both UVA and UVB radiation with an SPF of 30 or greater. Reapply every 2 to 3 hours or after sweating, drying off with a towel, or swimming. · Always wear a seat belt when traveling in a car. Always wear a helmet when riding a bicycle or motorcycle.

## 2022-11-07 ENCOUNTER — OFFICE VISIT (OUTPATIENT)
Dept: FAMILY MEDICINE CLINIC | Age: 77
End: 2022-11-07
Payer: MEDICARE

## 2022-11-07 VITALS
OXYGEN SATURATION: 97 % | HEART RATE: 68 BPM | BODY MASS INDEX: 25.91 KG/M2 | WEIGHT: 181 LBS | HEIGHT: 70 IN | SYSTOLIC BLOOD PRESSURE: 126 MMHG | DIASTOLIC BLOOD PRESSURE: 72 MMHG

## 2022-11-07 DIAGNOSIS — I10 PRIMARY HYPERTENSION: ICD-10-CM

## 2022-11-07 DIAGNOSIS — H60.502 ACUTE OTITIS EXTERNA OF LEFT EAR, UNSPECIFIED TYPE: Primary | ICD-10-CM

## 2022-11-07 PROCEDURE — 1123F ACP DISCUSS/DSCN MKR DOCD: CPT | Performed by: FAMILY MEDICINE

## 2022-11-07 PROCEDURE — G8427 DOCREV CUR MEDS BY ELIG CLIN: HCPCS | Performed by: FAMILY MEDICINE

## 2022-11-07 PROCEDURE — 99213 OFFICE O/P EST LOW 20 MIN: CPT | Performed by: FAMILY MEDICINE

## 2022-11-07 PROCEDURE — 4130F TOPICAL PREP RX AOE: CPT | Performed by: FAMILY MEDICINE

## 2022-11-07 PROCEDURE — 3074F SYST BP LT 130 MM HG: CPT | Performed by: FAMILY MEDICINE

## 2022-11-07 PROCEDURE — 1036F TOBACCO NON-USER: CPT | Performed by: FAMILY MEDICINE

## 2022-11-07 PROCEDURE — 3078F DIAST BP <80 MM HG: CPT | Performed by: FAMILY MEDICINE

## 2022-11-07 PROCEDURE — G8484 FLU IMMUNIZE NO ADMIN: HCPCS | Performed by: FAMILY MEDICINE

## 2022-11-07 PROCEDURE — G8417 CALC BMI ABV UP PARAM F/U: HCPCS | Performed by: FAMILY MEDICINE

## 2022-11-07 RX ORDER — CEFDINIR 300 MG/1
300 CAPSULE ORAL 2 TIMES DAILY
Qty: 20 CAPSULE | Refills: 0 | Status: SHIPPED | OUTPATIENT
Start: 2022-11-07 | End: 2022-11-17

## 2022-11-07 RX ORDER — OFLOXACIN 3 MG/ML
5 SOLUTION AURICULAR (OTIC) 2 TIMES DAILY
Qty: 10 ML | Refills: 0 | Status: SHIPPED | OUTPATIENT
Start: 2022-11-07 | End: 2022-11-17

## 2022-11-07 ASSESSMENT — ENCOUNTER SYMPTOMS: SHORTNESS OF BREATH: 0

## 2022-11-07 NOTE — PROGRESS NOTES
Chief Complaint   Patient presents with    Otalgia     Left ear pain       HPI:  Caroline Ferguson is a 68 y.o. (: 1945) here today   for left ear pain. HPI  Ongoing issues w/ sinus issues. Had some pain to right ear few mo ago. Gradually improved. Now inc pain to left ear. Used some nasal spray, seemed to help w/ fullness. Has tried rinse as well. Pain seems to be at the ear. No drainage from the ear. Some swelling and discomfort noted to left ear. Worst at night. No fevers noted. Had issues w/ prior kidney stone removal.  Had labs done at [place]. Hx of prostate cancer. Prior surgery at [facility]. Patient's medications, allergies, past medical, surgical, social and family histories were reviewed and updated asappropriate. ROS:  Review of Systems   Constitutional:  Negative for fever. HENT:  Positive for ear pain. Respiratory:  Negative for shortness of breath. Prior to Visit Medications    Medication Sig Taking?  Authorizing Provider   ofloxacin (FLOXIN) 0.3 % otic solution Place 5 drops into the left ear 2 times daily for 10 days Yes Mohsen Owens MD   cefdinir (OMNICEF) 300 MG capsule Take 1 capsule by mouth 2 times daily for 10 days Yes Mohsen Owens MD   Cyanocobalamin (VITAMIN B-12 PO) Take by mouth Yes Historical Provider, MD   Turmeric 500 MG CAPS Take by mouth  Yes Historical Provider, MD   amLODIPine-benazepril (LOTREL) 5-10 MG per capsule Take 1 capsule by mouth daily Yes Historical Provider, MD   Omega-3 Fatty Acids (FISH OIL) 1000 MG CAPS Take 3,000 mg by mouth 2 times daily  Yes Historical Provider, MD   NONFORMULARY Take by mouth daily NERVE RENEW  Contains:  B-1, B-12 2000 mcg, R-Alpha Lopic Acid 150 mg, B-6 4 mg, Riboflavin 4 mg, Vitamin D 500 u, Benfodaime 300 mg, Proprietary Blend 43 mg Yes Historical Provider, MD   aspirin 81 MG chewable tablet Take 81 mg by mouth once a week  Yes Historical Provider, MD   gabapentin (NEURONTIN) 400 MG capsule 400 mg 2 times daily. Yes Historical Provider, MD   Multiple Vitamins-Minerals (CENTRUM SILVER PO) Take by mouth Yes Historical Provider, MD   Garlic 8799 MG CAPS Take by mouth  Yes Historical Provider, MD   Red Yeast Rice Extract (RED YEAST RICE PO) Take by mouth once a week  Yes Historical Provider, MD   Coenzyme Q10 (CO Q-10) 50 MG CAPS Take 100 mg by mouth daily Yes Historical Provider, MD   hydrocortisone 2.5 % cream Apply topically 2 times daily Apply topically 2 times daily. Patient not taking: Reported on 6/30/2022  LOBITO Prieto - CNP       Allergies   Allergen Reactions    Statins      Other reaction(s): Pain  Pain in back of legs    Medrol [Methylprednisolone]     Morphine      Kidney stone    Quinine Sulfate [Quinine]        OBJECTIVE:    /72   Pulse 68   Ht 5' 9.5\" (1.765 m)   Wt 181 lb (82.1 kg)   SpO2 97%   BMI 26.35 kg/m²     BP Readings from Last 2 Encounters:   11/07/22 126/72   06/30/22 120/78       Wt Readings from Last 3 Encounters:   11/07/22 181 lb (82.1 kg)   06/30/22 178 lb (80.7 kg)   06/09/22 179 lb (81.2 kg)       Physical Exam  Constitutional:       Appearance: Normal appearance. HENT:      Head: Normocephalic and atraumatic. Right Ear: Tympanic membrane normal. No tenderness. Left Ear: Tenderness present. Ears:      Comments: Swelling, redness, pain to left canal.   Eyes:      Extraocular Movements: Extraocular movements intact. Cardiovascular:      Rate and Rhythm: Normal rate and regular rhythm. Pulmonary:      Effort: Pulmonary effort is normal.      Breath sounds: Normal breath sounds. Abdominal:      Palpations: Abdomen is soft. Tenderness: There is no abdominal tenderness. Musculoskeletal:      Right lower leg: No edema. Left lower leg: No edema. Neurological:      General: No focal deficit present. Mental Status: He is alert and oriented to person, place, and time.    Psychiatric:         Mood and Affect: Mood normal. Behavior: Behavior normal.         ASSESSMENT/PLAN:     1. Acute otitis externa of left ear, unspecified type  Add drops as below. Fill oral abx only if fails to improve. Call if fails to improve  - ofloxacin (FLOXIN) 0.3 % otic solution; Place 5 drops into the left ear 2 times daily for 10 days  Dispense: 10 mL; Refill: 0  - cefdinir (OMNICEF) 300 MG capsule; Take 1 capsule by mouth 2 times daily for 10 days  Dispense: 20 capsule; Refill: 0    2. Primary hypertension  The current medical regimen is effective;  continue present plan and medications. Pt provided copy of labs done at [place]. Overall doing well.

## 2023-02-06 ENCOUNTER — OFFICE VISIT (OUTPATIENT)
Dept: FAMILY MEDICINE CLINIC | Age: 78
End: 2023-02-06
Payer: MEDICARE

## 2023-02-06 VITALS
HEART RATE: 70 BPM | OXYGEN SATURATION: 96 % | SYSTOLIC BLOOD PRESSURE: 134 MMHG | BODY MASS INDEX: 26.05 KG/M2 | DIASTOLIC BLOOD PRESSURE: 70 MMHG | WEIGHT: 179 LBS

## 2023-02-06 DIAGNOSIS — M25.522 LEFT ELBOW PAIN: Primary | ICD-10-CM

## 2023-02-06 DIAGNOSIS — C61 PRIMARY MALIGNANT NEOPLASM OF PROSTATE (HCC): ICD-10-CM

## 2023-02-06 PROCEDURE — G8427 DOCREV CUR MEDS BY ELIG CLIN: HCPCS

## 2023-02-06 PROCEDURE — 1123F ACP DISCUSS/DSCN MKR DOCD: CPT

## 2023-02-06 PROCEDURE — G8484 FLU IMMUNIZE NO ADMIN: HCPCS

## 2023-02-06 PROCEDURE — 3078F DIAST BP <80 MM HG: CPT

## 2023-02-06 PROCEDURE — 1036F TOBACCO NON-USER: CPT

## 2023-02-06 PROCEDURE — G8417 CALC BMI ABV UP PARAM F/U: HCPCS

## 2023-02-06 PROCEDURE — 99214 OFFICE O/P EST MOD 30 MIN: CPT

## 2023-02-06 PROCEDURE — 3075F SYST BP GE 130 - 139MM HG: CPT

## 2023-02-06 ASSESSMENT — PATIENT HEALTH QUESTIONNAIRE - PHQ9
SUM OF ALL RESPONSES TO PHQ QUESTIONS 1-9: 0
1. LITTLE INTEREST OR PLEASURE IN DOING THINGS: 0
SUM OF ALL RESPONSES TO PHQ9 QUESTIONS 1 & 2: 0
2. FEELING DOWN, DEPRESSED OR HOPELESS: 0
SUM OF ALL RESPONSES TO PHQ QUESTIONS 1-9: 0

## 2023-02-06 ASSESSMENT — ENCOUNTER SYMPTOMS
RESPIRATORY NEGATIVE: 1
GASTROINTESTINAL NEGATIVE: 1

## 2023-02-06 NOTE — PROGRESS NOTES
Chief Complaint   Patient presents with    Elbow Pain     Left elbow pain        HPI:  Ranjith Gallegos is a 68 y.o. (: 1945) here today   for evaluation of left elbow pain x1 month.  has been moving out of his home and was lifting things and felt a pop in his left elbow. States became swollen and is caused pain in his left elbow since initial injury. States pain will radiate up and down his left arm. There is no visible redness, inflammation at this time. Patient has been taking ibuprofen 600 mg as needed for pain relief. Also using ice as needed. Patient daughter is a physical therapist and told patient he needs to get an MRI of his left elbow. Patient very concerned regarding his condition and wants to proceed forward with an MRI to ensure there is no major damage that needs surgically repaired. Is seeing urology in July. Se[place] in [place]. Hx of prostate cancer. Patient's medications, allergies, past medical, surgical, social and family histories were reviewed and updated asappropriate. ROS:  Review of Systems   Constitutional: Negative. HENT: Negative. Respiratory: Negative. Cardiovascular: Negative. Gastrointestinal: Negative. Musculoskeletal:  Positive for arthralgias. Neurological: Negative. Psychiatric/Behavioral:  The patient is nervous/anxious. Prior to Visit Medications    Medication Sig Taking? Authorizing Provider   Cyanocobalamin (VITAMIN B-12 PO) Take by mouth Yes Historical Provider, MD   hydrocortisone 2.5 % cream Apply topically 2 times daily Apply topically 2 times daily.  Yes Sheilda Back, APRN - CNP   Turmeric 500 MG CAPS Take by mouth  Yes Historical Provider, MD   amLODIPine-benazepril (LOTREL) 5-10 MG per capsule Take 1 capsule by mouth daily Yes Historical Provider, MD   Omega-3 Fatty Acids (FISH OIL) 1000 MG CAPS Take 3,000 mg by mouth 2 times daily  Yes Historical Provider, MD   NONFORMULARY Take by mouth daily NERVE RENEW  Contains:  B-1, B-12 2000 mcg, R-Alpha Lopic Acid 150 mg, B-6 4 mg, Riboflavin 4 mg, Vitamin D 500 u, Benfodaime 300 mg, Proprietary Blend 43 mg Yes Historical Provider, MD   aspirin 81 MG chewable tablet Take 81 mg by mouth once a week  Yes Historical Provider, MD   gabapentin (NEURONTIN) 400 MG capsule 400 mg 2 times daily. Yes Historical Provider, MD   Multiple Vitamins-Minerals (CENTRUM SILVER PO) Take by mouth Yes Historical Provider, MD   Garlic 5643 MG CAPS Take by mouth  Yes Historical Provider, MD   Red Yeast Rice Extract (RED YEAST RICE PO) Take by mouth once a week  Yes Historical Provider, MD   Coenzyme Q10 (CO Q-10) 50 MG CAPS Take 100 mg by mouth daily Yes Historical Provider, MD       Allergies   Allergen Reactions    Statins      Other reaction(s): Pain  Pain in back of legs    Medrol [Methylprednisolone]     Morphine      Kidney stone    Quinine Sulfate [Quinine]        OBJECTIVE:    /70 (Site: Right Upper Arm, Position: Sitting)   Pulse 70   Wt 179 lb (81.2 kg)   SpO2 96%   BMI 26.05 kg/m²     BP Readings from Last 2 Encounters:   02/06/23 134/70   11/07/22 126/72       Wt Readings from Last 3 Encounters:   02/06/23 179 lb (81.2 kg)   11/07/22 181 lb (82.1 kg)   06/30/22 178 lb (80.7 kg)       Physical Exam  Constitutional:       Appearance: Normal appearance. Cardiovascular:      Rate and Rhythm: Normal rate and regular rhythm. Pulses: Normal pulses. Heart sounds: Normal heart sounds. Pulmonary:      Effort: Pulmonary effort is normal.      Breath sounds: Normal breath sounds. No wheezing. Abdominal:      General: Bowel sounds are normal.   Musculoskeletal:         General: Tenderness and signs of injury present. No swelling or deformity. Right lower leg: No edema. Left lower leg: No edema. Neurological:      General: No focal deficit present. Mental Status: He is alert and oriented to person, place, and time.    Psychiatric:      Comments: Anxious in office today         ASSESSMENT/PLAN:    1. Left elbow pain  See above HPI for symptoms and onset. Suspect epicondylitis given symptoms and location of pain. Given no improvement of patient left elbow pain since onset of injury ongoing to go ahead and order imaging at this time. I explained to the patient typically with insurance coverage they require x-ray imaging prior to covering MRI evaluation. Patient will be sent to [facility] for x-ray imaging of left elbow. If no abnormalities noted that would cause left elbow pain we will proceed forward with MRI that I ordered today also. Patient will continue with OTC NSAIDs and ice as needed for pain/swelling. Recommend the patient eat small amount of food such as crackers or slice of bread while taking NSAIDs to avoid GI distress. May also take supplemental Tylenol as needed for added pain relief. Would recommend using compression to the area to help aid in support of the affected area. 2.  Primary malignant neoplasm of prostate [facility])  Did not draw PSA today. Patient is set to follow-up with his urologist in July of this year. Patient follows urology on routine basis. Encourage patient to ensure does not miss any routine appointments    - XR ELBOW LEFT (2 VIEWS); Future  - MRI ELBOW LEFT WO CONTRAST; Future    30 min spent on pt care. This document was prepared by a combination of typing and transcription through a voice recognition software.     LOBITO Hannon - CNP

## 2023-02-08 ENCOUNTER — HOSPITAL ENCOUNTER (OUTPATIENT)
Dept: MRI IMAGING | Age: 78
Discharge: HOME | End: 2023-02-08
Payer: MEDICARE

## 2023-02-08 DIAGNOSIS — M25.522 LEFT ELBOW PAIN: ICD-10-CM

## 2023-02-08 PROCEDURE — 73221 MRI JOINT UPR EXTREM W/O DYE: CPT

## 2023-02-16 ENCOUNTER — OFFICE VISIT (OUTPATIENT)
Dept: ORTHOPEDIC SURGERY | Age: 78
End: 2023-02-16

## 2023-02-16 VITALS — BODY MASS INDEX: 25.06 KG/M2 | WEIGHT: 179 LBS | HEIGHT: 71 IN

## 2023-02-16 DIAGNOSIS — M77.12 LATERAL EPICONDYLITIS OF LEFT ELBOW: ICD-10-CM

## 2023-02-16 DIAGNOSIS — M25.522 LEFT ELBOW PAIN: Primary | ICD-10-CM

## 2023-02-16 NOTE — PROGRESS NOTES
Date:  2023    Name:  Jamie Galindo  Address:  [address] [place] [address] [address] [id number]    :  1945      Age:   68 y.o.    SSN:  [id number]      Medical Record Number:  [id number]    Reason for Visit:    Chief Complaint    Elbow Pain (LT ELBOW PAIN AFTER LIFTING SOMETHING IN [place])      DOS:2023     HPI: Stewart Orr is a 68 y.o. male here today for new patient evaluation regarding his left elbow. He was referred to me by [place]. He has been having pain for little over a month now. He was removing some things and felt a pop in his elbow. He noted some swelling and pain there. Since then he has noted some improvement in the pain but he does report a soreness especially over the outside aspect of his elbow. He is left-hand dominant. He has bought an elbow brace which has been helping his pain. He denies any numbness and tingling down into the hand. ROS: All systems reviewed on patient intake form. Pertinent items are noted in HPI. Past Medical History:   Diagnosis Date    Heart disease     History of prostate cancer     HTN (hypertension)     Kidney stone         Past Surgical History:   Procedure Laterality Date    BACK SURGERY      fractures disc (Dr. Yuli Rodas at [facility]) [place] in back    [address]      LITHOTRIPSY Left 2017    [address]  2018    prostate cancer       History reviewed. No pertinent family history.     Social History     Socioeconomic History    Marital status:      Spouse name: None    Number of children: None    Years of education: None    Highest education level: None   Tobacco Use    Smoking status: Former     Packs/day: 0.50     Years: 3.00     Pack years: 1.50     Types: Cigarettes     Quit date: 1970     Years since quittin.1    Smokeless tobacco: Former   Vaping Use    Vaping Use: Never used Substance and Sexual Activity    Alcohol use: Never    Drug use: Never    Sexual activity: Not Currently     Social Determinants of Health     Physical Activity: Insufficiently Active    Days of Exercise per Week: 3 days    Minutes of Exercise per Session: 40 min       Current Outpatient Medications   Medication Sig Dispense Refill    Cyanocobalamin (VITAMIN B-12 PO) Take by mouth      hydrocortisone 2.5 % cream Apply topically 2 times daily Apply topically 2 times daily. 30 g 2    Turmeric 500 MG CAPS Take by mouth       amLODIPine-benazepril (LOTREL) 5-10 MG per capsule Take 1 capsule by mouth daily      Omega-3 Fatty Acids (FISH OIL) 1000 MG CAPS Take 3,000 mg by mouth 2 times daily       NONFORMULARY Take by mouth daily NERVE RENEW  Contains:  B-1, B-12 2000 mcg, R-Alpha Lopic Acid 150 mg, B-6 4 mg, Riboflavin 4 mg, Vitamin D 500 u, Benfodaime 300 mg, Proprietary Blend 43 mg      aspirin 81 MG chewable tablet Take 81 mg by mouth once a week       gabapentin (NEURONTIN) 400 MG capsule 400 mg 2 times daily. Multiple Vitamins-Minerals (CENTRUM SILVER PO) Take by mouth      Garlic 6829 MG CAPS Take by mouth       Red Yeast Rice Extract (RED YEAST RICE PO) Take by mouth once a week       Coenzyme Q10 (CO Q-10) 50 MG CAPS Take 100 mg by mouth daily       No current facility-administered medications for this visit. Allergies   Allergen Reactions    Statins      Other reaction(s): Pain  Pain in back of legs    Medrol [Methylprednisolone]     Morphine      Kidney stone    Quinine Sulfate [Quinine]        Vital signs:  Ht 5' 11\" (1.803 m)   Wt 179 lb (81.2 kg)   BMI 24.97 kg/m²      Left elbow Examination:    Inspection:    Normal alignment. No swelling. Palpation:    Mildly tender to palpation over the medial and lateral epicondyles. Increasing tenderness along the proximal aspect of the EXTR, extensor tendon. Minimal tenderness along the common flexor tendon.   Pain with resisted wrist and middle finger extension located over the lateral elbow. No pain to the common flexor tendon on resisted wrist flexion. Range of Motion:      0-135 degrees. Strength:       5/5 in all muscle groups    Instability testing:  Stable to varus and valgus stress    Vascular exam:    Skin warm and well-perfused. Neurologic exam:     Sensation intact to light touch      Diagnostics:  Radiology:       3 views of the left elbow taken in the office today demonstrate no acute osseous abnormalities with well-maintained radiocapitellar and ulnohumeral joint spaces. MRI results of left elbow from 2/8/2023 demonstrate edema around the medial epicondyle and likely partial tearing of the common flexor tendon origin. Tendinopathy findings of the common extensor origin at the lateral epicondyle. Impression   1. Tendinopathy and partial tear of the common flexor tendon origin. New   item tendinopathy a partial tear of the common extensor tendon origin. Assessment: 68 y.o. male with left elbow lateral epicondylitis    Plan: Pertinent imaging was reviewed. The etiology, natural history, and treatment options for the disorder were discussed. The roles of activity medication, antiinflammatories, injections, bracing, physical therapy, and surgical interventions were all described to the patient and questions were answered. The patient appears to have aggravated a lateral epicondylitis issue with his elbow. Although the MRI does demonstrate potential tearing to the common flexor tendon he is not symptomatic there. Continue with the elbow brace. Continue with ice and anti-inflammatories as tolerated. The patient cannot tolerate steroids as he has had a steroid injection previously and had a systemic issue with it. I will also get [facility] to work on a tennis elbow protocol. Follow-up in 2 months for repeat check.   I did discuss with the patient that this is something that can usually linger for quite some time but usually does not require surgical intervention. He verbalized understanding. Jessy Khalil is in agreement with this plan. All questions were answered to patient's satisfaction and was encouraged to call with any further questions. The patient was advised that NSAID-type medications have several potential side effects that include: gastrointestinal irritation including hemorrhage, renal injury, as well as an increased risk for heart attack and stroke. The patient was asked to take the medication with food and to stop if there is any symptoms of GI upset, including heartburn, nausea, increased gas or diarrhea. I asked the patient to contact their medical provider for vomiting, abdominal pain or black/bloody stools. The patient should have renal function testing per his medical provider periodically if the medication is taken on a regular basis. The patient should be alert for any swelling in the lower extremities and should stop taking the medication immediately and contact their medical provider should this occur. In addition, the patient should stop taking the medication immediately and contact their medical provider should there be any shortness of breath, fatigue and be evaluated in an emergency facility for any chest pain. The patient expresses understanding of these issues and questions were answered.         Marquita English, DO  Orthopedic Surgery and Sports Medicine  2/16/2023    Orders Placed This Encounter   Procedures    XR ELBOW LEFT (MIN 3 VIEWS)     Standing Status:   Future     Number of Occurrences:   1     Standing Expiration Date:   2/16/2024    [facility] Physical Therapy [facility] ([facility]) (Ortho & Sports)-OSR     Referral Priority:   Routine     Referral Type:   Eval and Treat     Referral Reason:   Specialty Services Required     Requested Specialty:   Physical Therapist     Number of Visits Requested:   1

## 2023-02-27 ENCOUNTER — HOSPITAL ENCOUNTER (OUTPATIENT)
Dept: PHYSICAL THERAPY | Age: 78
Setting detail: THERAPIES SERIES
Discharge: HOME | End: 2023-02-27
Payer: MEDICARE

## 2023-02-27 PROCEDURE — 97110 THERAPEUTIC EXERCISES: CPT

## 2023-02-27 PROCEDURE — 97161 PT EVAL LOW COMPLEX 20 MIN: CPT

## 2023-02-27 PROCEDURE — 97140 MANUAL THERAPY 1/> REGIONS: CPT

## 2023-02-27 NOTE — PLAN OF CARE
[address],  [facility] [address], [address] [facility], [place], [address] [facility] [address]  Phone: [phone], [phone]     Physical Therapy Certification    Dear Kali Lopez DO,    We had the pleasure of evaluating the following patient for physical therapy services at [address] [place] [address]. A summary of our findings can be found in the initial assessment below. This includes our plan of care. If you have any questions or concerns regarding these findings, please do not hesitate to contact me at the office phone number checked above. Thank you for the referral.       Physician Signature:_______________________________Date:__________________  By signing above (or electronic signature), therapists plan is approved by physician    Patient: Leonel Dear   : 1945   MRN: [id number]  Referring Physician: Kali Lopez DO      Evaluation Date: 2023      Medical Diagnosis Information:  Diagnosis: M77.12 Lateral epicondylitis of left elbow   Treatment Diagnosis: M25.522 left elbow pain; M62.82 left UE weakness; M25.612 left elbow stiffness                                         Insurance information: PT Insurance Information: /[facility][1] no auth    Precautions/ Contra-indications: none  Latex Allergy:  [x]NO      []YES  Preferred Language for Healthcare:   [x]English       []other:    C-SSRS Triggered by Intake questionnaire (Past 2 wk assessment):   [x] No, Questionnaire did not trigger screening.   [] Yes, Patient intake triggered further evaluation      [] C-SSRS Screening completed  [] PCP notified via Plan of Care  [] Emergency services notified     SUBJECTIVE: Patient stated complaint: Pt reports he has been having some left elbow pain. Started in December, gradually got worse and using screw  a lot was really bothering it. MRI showed a tear in the muscle and was told it would heal on its own.  Doing a lot better, avoids lifting as much as he can since that is what aggravates it. No pain meds or injections. Pain is mostly on lateral aspect of elbow. Did get an elbow brace that does help some with it on. Told he probably only needs to come a few times to PT    Had prostrate cancer and had it removed. Back surgery in 2010    Is left handed. Functional Disability Index: Quick DASH 27% 23/55    Pain Scale: 3-4 at worst 0 currently  /10  Easing factors: rest, avoidance  Provocative factors: lifting, twisting (putting in screws)     Type: []Constant   []Intermittent  []Radiating []Localized []other:     Numbness/Tingling: denies    Occupation/School: retired    Living Status/Prior Level of Function: Independent with ADLs and IADLs,     OBJECTIVE:     ROM Left Right   Elbow  Flex Full    Elbow  ext Full with P! At end range              Strength  Left Right   Bicep 4    Tricep 4                     Reflexes/Sensation: NT no neural complaints   []Dermatomes/Myotomes intact    []Reflexes equal and normal bilaterally   []Other:    Joint mobility:    [x]Normal    []Hypo   []Hyper    Palpation: TTP with increased tissue tension throughout lateral elbow musculature     Functional Mobility/Transfers: independent    Posture: forward head, rounded shoulders                        [x] Patient history, allergies, meds reviewed. Medical chart reviewed. See intake form. Review Of Systems (ROS):  [x]Performed Review of systems (Integumentary, CardioPulmonary, Neurological) by intake and observation. Intake form has been scanned into medical record. Patient has been instructed to contact their primary care physician regarding ROS issues if not already being addressed at this time.       Co-morbidities/Complexities (which will affect course of rehabilitation):   []None           Arthritic conditions   []Rheumatoid arthritis (M05.9)  [x]Osteoarthritis (M19.91)   Cardiovascular conditions   [x]Hypertension (I10)  []Hyperlipidemia (E78.5)  []Angina pectoris (I20)  []Atherosclerosis (I70)   Musculoskeletal conditions   []Disc pathology   []Congenital spine pathologies   []Prior surgical intervention  []Osteoporosis (M81.8)  []Osteopenia (M85.8)   Endocrine conditions   []Hypothyroid (E03.9)  []Hyperthyroid Gastrointestinal conditions   []Constipation (K52.60)   Metabolic conditions   []Morbid obesity (E66.01)  []Diabetes type 1(E10.65) or 2 (E11.65)   []Neuropathy (G60.9)     Pulmonary conditions   []Asthma (J45)  []Coughing   []COPD (J44.9)   Psychological Disorders  []Anxiety (F41.9)  []Depression (F32.9)   []Other:   []Other:          Barriers to/and or personal factors that will affect rehab potential:              [x]Age  [x]Sex              [x]Motivation/Lack of Motivation                        [x]Co-Morbidities              []Cognitive Function, education/learning barriers              []Environmental, home barriers              []profession/work barriers  []past PT/medical experience  []other:  Justification:     ASSESSMENT:   Functional Impairments   [x]Noted spinal or UE joint hypomobility   []Noted spinal or UE joint hypermobility   [x]Decreased UE functional ROM   [x]Decreased UE functional strength   []Abnormal reflexes/sensation/myotomal/dermatomal deficits   [x]Decreased RC/scapular/core strength and neuromuscular control   []other:      Functional Activity Limitations (from functional questionnaire and intake)   [x]Reduced ability to tolerate prolonged functional positions   [x]Reduced ability or difficulty with changes of positions or transfers between positions   [x]Reduced ability to maintain good posture and demonstrate good body mechanics with sitting, bending, and lifting   [x] Reduced ability or tolerance with driving and/or computer work   [x]Reduced ability to sleep   [x]Reduced ability to perform lifting, reaching, carrying tasks   [x]Reduced ability to tolerate impact through UE   [x]Reduced ability to reach behind back   [x]Reduced ability to  or hold objects   [x]Reduced ability to throw or toss an object   []other:    Participation Restrictions   [x]Reduced participation in self care activities   [x]Reduced participation in home management activities   [x]Reduced participation in work activities   [x]Reduced participation in social activities. [x]Reduced participation in sport/recreation activities. Classification:   []Signs/symptoms consistent with post-surgical status including decreased ROM, strength and function.   [x]Signs/symptoms consistent with joint sprain/strain   []Signs/symptoms consistent with shoulder impingement   []Signs/symptoms consistent with shoulder/elbow/wrist tendinopathy   []Signs/symptoms consistent with Rotator cuff tear   []Signs/symptoms consistent with labral tear   []Signs/symptoms consistent with postural dysfunction    []Signs/symptoms consistent with Glenohumeral IR Deficit - <45 degrees   []Signs/symptoms consistent with facet dysfunction of cervical/thoracic spine    []Signs/symptoms consistent with pathology which may benefit from Dry needling     []other:     Prognosis/Rehab Potential:      []Excellent   [x]Good    []Fair   []Poor    Tolerance of evaluation/treatment:    []Excellent   [x]Good    []Fair   []Poor  Physical Therapy Evaluation Complexity Justification  [x] A history of present problem with:  [] no personal factors and/or comorbidities that impact the plan of care;  []1-2 personal factors and/or comorbidities that impact the plan of care  [x]3 personal factors and/or comorbidities that impact the plan of care  [x] An examination of body systems using standardized tests and measures addressing any of the following: body structures and functions (impairments), activity limitations, and/or participation restrictions;:  [] a total of 1-2 or more elements   [] a total of 3 or more elements   [x] a total of 4 or more elements   [x] A clinical presentation with:  [x] stable and/or uncomplicated characteristics   [] evolving clinical presentation with changing characteristics  [] unstable and unpredictable characteristics;   [x] Clinical decision making of [x] low, [] moderate, [] high complexity using standardized patient assessment instrument and/or measurable assessment of functional outcome. [x] EVAL (LOW) 90843 (typically 20 minutes face-to-face)  [] EVAL (MOD) 95710 (typically 30 minutes face-to-face)  [] EVAL (HIGH) 14159 (typically 45 minutes face-to-face)  [] RE-EVAL     PLAN:  Frequency/Duration:  1-2 days per week for 12 Weeks:  INTERVENTIONS:  [x] Therapeutic exercise including: strength training, ROM, for Upper extremity and core   [x]  NMR activation and proprioception for UE, scap and Core   [x] Manual therapy as indicated for shoulder, scapula and spine to include: Dry Needling/IASTM, STM, PROM, Gr I-IV mobilizations, manipulation. [x] Modalities as needed that may include: thermal agents, E-stim, Biofeedback, US, iontophoresis as indicated  [x] Patient education on joint protection, postural re-education, activity modification, progression of HEP. GOALS:  Patient stated goal: Pain free full use of arm  [] Progressing: [] Met: [] Not Met: [] Adjusted    Therapist goals for Patient:   Short Term Goals: To be achieved in: 2 weeks  1. Independent in HEP and progression per patient tolerance, in order to prevent re-injury. [] Progressing: [] Met: [] Not Met: [] Adjusted  2. Patient will have a decrease in pain to facilitate improvement in movement, function, and ADLs as indicated by Functional Deficits. [] Progressing: [] Met: [] Not Met: [] Adjusted    Long Term Goals: To be achieved in: 12 weeks  1. Disability index score of 13% or less on quick DASH to assist with reaching prior level of function. [] Progressing: [] Met: [] Not Met: [] Adjusted  2.  Patient will demonstrate increased AROM to full and pain free elbow extension to allow for proper joint functioning as indicated by patients Functional Deficits. [] Progressing: [] Met: [] Not Met: [] Adjusted  3. Patient will demonstrate an increase in Strength to at least 4+/5 throughout UE to allow for proper functional mobility as indicated by patients Functional Deficits. [] Progressing: [] Met: [] Not Met: [] Adjusted  4. Patient will return to dressing and hygiene ADLs without increased symptoms or restriction.    [] Progressing: [] Met: [] Not Met: [] Adjusted        Electronically signed by:  Gayle Swift PT DPT [id number]

## 2023-02-27 NOTE — FLOWSHEET NOTE
723 [facility] and [address] [place] [address] 904 [facility], [address] [place] [address]  Phone: [phone], [phone]    Physical Therapy Daily Treatment Note  Date:  2023    Patient Name:  Leonel Dear    :  1945  MRN: [id number]  Restrictions/Precautions:    Physician Information:  Kali Lopez DO  Medical/Treatment Diagnosis Information:  Diagnosis: M77.12 Lateral epicondylitis of left elbow  Treatment Diagnosis: M25.522 left elbow pain; M62.82 left UE weakness; M25.612 left elbow stiffness      [x] Conservative / [] Surgical - DOS:  Therapy Diagnosis/Practice Pattern:  Practice Pattern C: Impaired Muscle Performance  Insurance/Certification information:  PT Insurance Information: /Med [facility] BMZEINAB no auth  Plan of care signed: [x] YES  [] NO  Number of Comorbidities:  []0     [x]1-2    []3+  Date of Patient follow up with Physician:     Is this a Progress Report:     []  Yes  [x]  No        If Yes:  Date Range for reporting period:  Beginning 2023  Ending     Progress report will be due (10 Rx or 30 days whichever is less): 4415       Recertification will be due (POC Duration  / 90 days whichever is less): 2023      Latex Allergy:  [x]NO      []YES  Preferred Language for Healthcare:   [x]English       []other:    Visit # Insurance Allowable   1 BMN  auth [x]no        []yes:         SUBJECTIVE:  See eval    OBJECTIVE:  Observation:  Palpation:    Test used Initial score Current Score   Pain     Quickdash     flexion     ER     ABD     IR     flexion     ER     ABD     IR        RESTRICTIONS/PRECAUTIONS: none    Exercises/Interventions:   Therapeutic Ex/NMR Sets/reps Notes   Wrist flex/ext 20 x 1#   Wrist pronation/supination 20 x 1#   Towel twists 20 x    Towel squeezes 20 x                                                           Pt ed: anatomy, PT progression 8 min    Manual Intervention     STM to lateral elbow musculature 8 min                          Access Code: [email or web address]  URL: [email or web address]  Date: 02/27/2023  Prepared by: Logan Matta    Exercises  Seated Wrist Flexion with Dumbbell - 1-2 x daily - 20 reps  Seated Wrist Extension with Dumbbell - 1-2 x daily - 20 reps  Seated Gripping Towel - 1-2 x daily - 20 reps  Seated Wrist Flexion and Extension with Towel Twist - 1-2 x daily - 20 reps  Forearm Pronation and Supination with Hammer - 1-2 x daily - 20 reps    Therapeutic Exercise and NMR EXR  [x] (45914) Provided verbal/tactile cueing for activities related to strengthening, flexibility, endurance, ROM  for improvements in scapular, scapulothoracic and UE control with self care, reaching, carrying, lifting, house/yardwork, driving/computer work.    [] (45517) Provided verbal/tactile cueing for activities related to improving balance, coordination, kinesthetic sense, posture, motor skill, proprioception  to assist with  scapular, scapulothoracic and UE control with self care, reaching, carrying, lifting, house/yardwork, driving/computer work.    Therapeutic Activities:    [] (52264 or 19753) Provided verbal/tactile cueing for activities related to improving balance, coordination, kinesthetic sense, posture, motor skill, proprioception and motor activation to allow for proper function of scapular, scapulothoracic and UE control with self care, carrying, lifting, driving/computer work.     Home Exercise Program:    [x] (38268) Reviewed/Progressed HEP activities related to strengthening, flexibility, endurance, ROM of scapular, scapulothoracic and UE control with self care, reaching, carrying, lifting, house/yardwork, driving/computer work  [] (45741) Reviewed/Progressed HEP activities related to improving balance, coordination, kinesthetic sense, posture, motor skill, proprioception of scapular, scapulothoracic and UE control with self care, reaching, carrying, lifting, house/yardwork, driving/computer work   Manual Treatments:  PROM / STM / Oscillations-Mobs:  G-I, II, III, IV (PA's, Inf., Post.)  [x] (14595) Provided manual therapy to mobilize soft tissue/joints of cervical/CT, scapular GHJ and UE for the purpose of modulating pain, promoting relaxation,  increasing ROM, reducing/eliminating soft tissue swelling/inflammation/restriction, improving soft tissue extensibility and allowing for proper ROM for normal function with self care, reaching, carrying, lifting, house/yardwork, driving/computer work    Modalities:      [] GR/ESU 15 min    [] GR 15 min   [] ESU     [] CP    [] MHP    [] declined    Charges:  Timed Code Treatment Minutes: 30   Total Treatment Minutes: 40     [x] EVAL (LOW) 42173 (typically 20 minutes face-to-face)  [] EVAL (MOD) 99677 (typically 30 minutes face-to-face)  [] EVAL (HIGH) 57434 (typically 45 minutes face-to-face)  [] RE-EVAL     [x] GC(64503) x 1    [] IONTO  [] NMR (99685) x     [] VASO  [x] Manual (97271) x 1     [] Other:  [] TA x      [] Mech Traction (02680)  [] ES(attended) (24049)      [] ES (un) (88284):     Bienvenido Posada stated goal: Pain free full use of arm  [] Progressing: [] Met: [] Not Met: [] Adjusted    Therapist goals for Patient:   Short Term Goals: To be achieved in: 2 weeks  1. Independent in HEP and progression per patient tolerance, in order to prevent re-injury. [] Progressing: [] Met: [] Not Met: [] Adjusted  2. Patient will have a decrease in pain to facilitate improvement in movement, function, and ADLs as indicated by Functional Deficits. [] Progressing: [] Met: [] Not Met: [] Adjusted    Long Term Goals: To be achieved in: 12 weeks  1. Disability index score of 13% or less on quick DASH to assist with reaching prior level of function. [] Progressing: [] Met: [] Not Met: [] Adjusted  2. Patient will demonstrate increased AROM to full and pain free elbow extension to allow for proper joint functioning as indicated by patients Functional Deficits.    [] Progressing: [] Met: [] Not Met: [] Adjusted  3. Patient will demonstrate an increase in Strength to at least 4+/5 throughout UE to allow for proper functional mobility as indicated by patients Functional Deficits. [] Progressing: [] Met: [] Not Met: [] Adjusted  4. Patient will return to dressing and hygiene ADLs without increased symptoms or restriction. [] Progressing: [] Met: [] Not Met: [] Adjusted       Overall Progression Towards Functional goals/ Treatment Progress Update:  [] Patient is progressing as expected towards functional goals listed. [] Progression is slowed due to complexities/Impairments listed. [] Progression has been slowed due to co-morbidities. [x] Plan just implemented, too soon to assess goals progression <30days   [] Goals require adjustment due to lack of progress  [] Patient is not progressing as expected and requires additional follow up with physician  [] Other    Prognosis for POC: [x] Good [] Fair  [] Poor      Patient requires continued skilled intervention: [x] Yes  [] No      ASSESSMENT:  See eval    Treatment/Activity Tolerance:  [x] Patient tolerated treatment well [] Patient limited by fatigue  [] Patient limited by pain  [] Patient limited by other medical complications  [] Other:       PLAN: See eval  [] Continue per plan of care [] Alter current plan (see comments above)  [x] Plan of care initiated [] Hold pending MD visit [] Discharge      Electronically signed by:  Lee Saba PT , DPT [id number]    Note: If patient does not return for scheduled/ recommended follow up visits, this note will serve as a discharge from care along with most recent update on progress.

## 2023-03-07 ENCOUNTER — HOSPITAL ENCOUNTER (OUTPATIENT)
Dept: PHYSICAL THERAPY | Age: 78
Setting detail: THERAPIES SERIES
Discharge: HOME | End: 2023-03-07
Payer: MEDICARE

## 2023-03-07 PROCEDURE — 97110 THERAPEUTIC EXERCISES: CPT

## 2023-03-07 PROCEDURE — 97140 MANUAL THERAPY 1/> REGIONS: CPT

## 2023-03-07 NOTE — FLOWSHEET NOTE
723 [facility] and [address] [place] [address] 904 [facility], [address] [place] [address]  Phone: [phone], [phone]    Physical Therapy Daily Treatment Note  Date:  3/7/2023    Patient Name:  Gt Marie    :  1945  MRN: [id number]  Restrictions/Precautions:    Physician Information:  Lesle Payment, DO  Medical/Treatment Diagnosis Information:  Diagnosis: M77.12 Lateral epicondylitis of left elbow  Treatment Diagnosis: M25.522 left elbow pain; M62.82 left UE weakness; M25.612 left elbow stiffness      [x] Conservative / [] Surgical - DOS:  Therapy Diagnosis/Practice Pattern:  Practice Pattern C: Impaired Muscle Performance  Insurance/Certification information:  PT Insurance Information: /Med [facility] BMN no auth  Plan of care signed: [x] YES  [] NO  Number of Comorbidities:  []0     [x]1-2    []3+  Date of Patient follow up with Physician:     Is this a Progress Report:     []  Yes  [x]  No        If Yes:  Date Range for reporting period:  Beginning 2023  Ending     Progress report will be due (10 Rx or 30 days whichever is less):        Recertification will be due (POC Duration  / 90 days whichever is less): 2023      Latex Allergy:  [x]NO      []YES  Preferred Language for Healthcare:   [x]English       []other:    Visit # Insurance Allowable   2 BMN  auth [x]no        []yes:         SUBJECTIVE:   The patient states their pain is     racking. Soreness with racking. OBJECTIVE:  Observation:  Palpation:    Test used Initial score Current Score   Pain     Quickdash 27% (23/55)    ROM - elbow flexion Full    ROM - elbow extension Full with P!  At end range         Strength Left    Biceps 4/5    Triceps 4/5                 RESTRICTIONS/PRECAUTIONS: none    Exercises/Interventions:   Therapeutic Ex/NMR Sets/reps Notes   Wrist flex/ext 3 x 10 4#   Wrist pronation/supination 3 x 10 4#   Finger pinches  1 x 30 each  1st - green  2nd - green  3rd - green  4th - red  5th - yellow   Flexbar wrist extension 3 x 10 red   Flexbar wrist flexion 3 x 10 red   Flexbar supination 3 x 10 red   Flexbar pronation 3 x 10 red   Jennifer supination and pronation 1 x 15 yellow   Ball let go's and catches (wrist extension) 3 x 10 Red ball   Towel twists    Towel squeezes         Pt ed: anatomy, PT progression 8 min         Manual Intervention     STM to lateral elbow musculature  8 min                          Access Code: [email or web address]  URL: [email or web address]  Date: 02/27/2023  Prepared by: Isela Cheney    Exercises  Seated Wrist Flexion with Dumbbell - 1-2 x daily - 20 reps  Seated Wrist Extension with Dumbbell - 1-2 x daily - 20 reps  Seated Gripping Towel - 1-2 x daily - 20 reps  Seated Wrist Flexion and Extension with Towel Twist - 1-2 x daily - 20 reps  Forearm Pronation and Supination with Hammer - 1-2 x daily - 20 reps    Therapeutic Exercise and NMR EXR  [x] (68993) Provided verbal/tactile cueing for activities related to strengthening, flexibility, endurance, ROM  for improvements in scapular, scapulothoracic and UE control with self care, reaching, carrying, lifting, house/yardwork, driving/computer work.    [] (11033) Provided verbal/tactile cueing for activities related to improving balance, coordination, kinesthetic sense, posture, motor skill, proprioception  to assist with  scapular, scapulothoracic and UE control with self care, reaching, carrying, lifting, house/yardwork, driving/computer work. Therapeutic Activities:    [] (43825 or 27547) Provided verbal/tactile cueing for activities related to improving balance, coordination, kinesthetic sense, posture, motor skill, proprioception and motor activation to allow for proper function of scapular, scapulothoracic and UE control with self care, carrying, lifting, driving/computer work.      Home Exercise Program:    [x] (33696) Reviewed/Progressed HEP activities related to strengthening, flexibility, endurance, ROM of scapular, scapulothoracic and UE control with self care, reaching, carrying, lifting, house/yardwork, driving/computer work  [] (83243) Reviewed/Progressed HEP activities related to improving balance, coordination, kinesthetic sense, posture, motor skill, proprioception of scapular, scapulothoracic and UE control with self care, reaching, carrying, lifting, house/yardwork, driving/computer work      Manual Treatments:  PROM / STM / Oscillations-Mobs:  G-I, II, III, IV (PA's, Inf., Post.)  [x] (91561) Provided manual therapy to mobilize soft tissue/joints of cervical/CT, scapular GHJ and UE for the purpose of modulating pain, promoting relaxation,  increasing ROM, reducing/eliminating soft tissue swelling/inflammation/restriction, improving soft tissue extensibility and allowing for proper ROM for normal function with self care, reaching, carrying, lifting, house/yardwork, driving/computer work    Modalities:      [] GR/ESU 15 min    [] GR 15 min   [] ESU     [] CP    [] MHP    [] declined    Charges:  Timed Code Treatment Minutes: 45   Total Treatment Minutes: 45     [] EVAL (LOW) 74706 (typically 20 minutes face-to-face)  [] EVAL (MOD) 43838 (typically 30 minutes face-to-face)  [] EVAL (HIGH) 62281 (typically 45 minutes face-to-face)  [] RE-EVAL     [x] EM(09912) x 2    [] IONTO  [] NMR (69484) x     [] VASO  [x] Manual (43196) x 1  [] Other:  [] TA x      [] Mech Traction (71057)  [] ES(attended) (22052)     [] ES (un) (56941):     Kathrin Labor stated goal: Pain free full use of arm  [] Progressing: [] Met: [] Not Met: [] Adjusted    Therapist goals for Patient:   Short Term Goals: To be achieved in: 2 weeks  1. Independent in HEP and progression per patient tolerance, in order to prevent re-injury. [] Progressing: [] Met: [] Not Met: [] Adjusted  2.  Patient will have a decrease in pain to facilitate improvement in movement, function, and ADLs as indicated by Functional Deficits. [] Progressing: [] Met: [] Not Met: [] Adjusted    Long Term Goals: To be achieved in: 12 weeks  1. Disability index score of 13% or less on quick DASH to assist with reaching prior level of function. [] Progressing: [] Met: [] Not Met: [] Adjusted  2. Patient will demonstrate increased AROM to full and pain free elbow extension to allow for proper joint functioning as indicated by patients Functional Deficits. [] Progressing: [] Met: [] Not Met: [] Adjusted  3. Patient will demonstrate an increase in Strength to at least 4+/5 throughout UE to allow for proper functional mobility as indicated by patients Functional Deficits. [] Progressing: [] Met: [] Not Met: [] Adjusted  4. Patient will return to dressing and hygiene ADLs without increased symptoms or restriction. [] Progressing: [] Met: [] Not Met: [] Adjusted       Overall Progression Towards Functional goals/ Treatment Progress Update:  [] Patient is progressing as expected towards functional goals listed. [] Progression is slowed due to complexities/Impairments listed. [] Progression has been slowed due to co-morbidities. [x] Plan just implemented, too soon to assess goals progression <30days   [] Goals require adjustment due to lack of progress  [] Patient is not progressing as expected and requires additional follow up with physician  [] Other    Prognosis for POC: [x] Good [] Fair  [] Poor      Patient requires continued skilled intervention: [x] Yes  [] No      ASSESSMENT: The patient was able to tolerate exercises without any pain. The patient was cued to perform exercises in order to perform controled movements slower. The patient has a decrease of overall pain and function, however still needs their brace for activities. The patient will plan to wean off brace with activities at home.  The patient will continue to benefit from skilled physical therapy in order to improve overall function, return to prior ADL and IADL status, and be pain free in their L arm with functional activities. Treatment/Activity Tolerance:  [x] Patient tolerated treatment well [] Patient limited by fatigue  [] Patient limited by pain  [] Patient limited by other medical complications  [] Other:       PLAN: See eval  [x] Continue per plan of care [] Alter current plan (see comments above)  [] Plan of care initiated [] Hold pending MD visit [] Discharge      Electronically signed by:  Yoly Magaña, PT , DPT, CSCS    Note: If patient does not return for scheduled/ recommended follow up visits, this note will serve as a discharge from care along with most recent update on progress.

## 2023-03-14 ENCOUNTER — HOSPITAL ENCOUNTER (OUTPATIENT)
Dept: PHYSICAL THERAPY | Age: 78
Setting detail: THERAPIES SERIES
Discharge: HOME | End: 2023-03-14
Payer: MEDICARE

## 2023-03-14 PROCEDURE — 97140 MANUAL THERAPY 1/> REGIONS: CPT

## 2023-03-14 PROCEDURE — 97110 THERAPEUTIC EXERCISES: CPT

## 2023-03-14 NOTE — FLOWSHEET NOTE
[place] [id number] [facility], [place] [address] [facility], [address] [facility] [address] [facility]  Phone: [phone], [phone]    Physical Therapy Re-Certification Plan of Care/MD UPDATE      Dear Madiha Chavez DO ,    We had the pleasure of treating the following patient for physical therapy services at [address] [place] [address]. A summary of our findings can be found in the updated assessment below. This includes our plan of care. If you have any questions or concerns regarding these findings, please do not hesitate to contact me at the office phone number checked above. Thank you for the referral.     Physician Signature:________________________________Date:__________________  By signing above (or electronic signature), therapists plan is approved by physician    Date Range Of Visits: 23 - 23  Total Visits to Date: 3  Overall Response to Treatment:   [x]Patient is responding well to treatment and improvement is noted with regards  to goals   []Patient should continue to improve in reasonable time if they continue HEP   []Patient has plateaued and is no longer responding to skilled PT intervention    []Patient is getting worse and would benefit from return to referring MD   []Patient unable to adhere to initial POC   [x]Other: Patient is discharged from therapy. Patient has met all goals for therapy. Patient is back to [facility].            Physical Therapy Daily Treatment Note  Date:  3/14/2023    Patient Name:  Stone Mai    :  1945  MRN: [id number]  Restrictions/Precautions:    Physician Information:  Edin Morton DO  Medical/Treatment Diagnosis Information:  Diagnosis: M77.12 Lateral epicondylitis of left elbow  Treatment Diagnosis: M25.522 left elbow pain; M62.82 left UE weakness; M25.612 left elbow stiffness      [x] Conservative / [] Surgical - DOS:  Therapy Diagnosis/Practice Pattern:  Practice Pattern C: Impaired Muscle Performance  Insurance/Certification information:  PT Insurance Information: /[facility] [place] ivana reyes BMN no auth  Plan of care signed: [x] YES  [] NO  Number of Comorbidities:  []0     [x]1-2    []3+  Date of Patient follow up with Physician:     Is this a Progress Report:     []  Yes  [x]  No        If Yes:  Date Range for reporting period:  Beginning 2/27/2023  Ending     Progress report will be due (10 Rx or 30 days whichever is less): 7/78/2345       Recertification will be due (POC Duration  / 90 days whichever is less): 5/22/2023      Latex Allergy:  [x]NO      []YES  Preferred Language for Healthcare:   [x]English       []other:    Visit # Insurance Allowable   3 BMN  auth [x]no        []yes:         SUBJECTIVE:   The patient states they feel like they are back to their normal self. Patient states they feel they are ready to get discharged. OBJECTIVE:  Observation:  Palpation:    Test used Initial score Current Score   Pain  0/10   Quickdash 27% (23/55) 0% (11/55)   ROM - elbow flexion Full Full   ROM - elbow extension Full with P! At end range No pain, full        Strength Left Left   Biceps 4/5 5/5   Triceps 4/5 5/5                RESTRICTIONS/PRECAUTIONS: none    Exercises/Interventions:   Therapeutic Ex/NMR Sets/reps Notes   Bicep curl 3 x 10; red TB    Tricep curl 3 x 10; red TB    High Row 3 x 10; red TB    Row 3 x 10; red TB    Shoulder extension 3 x 10; red TB         Wrist flex/ext 3 x 10 Red TB   Wrist pronation/supination Finger pinches Flexbar wrist extension Flexbar wrist flexion Flexbar supination Flexbar pronation Jennifer supination and pronation Ball let go's and catches (wrist extension) Towel twists    Towel squeezes         Pt ed: anatomy, PT progression 3 min Band progression, update on general shoulder strengthening HEP        Manual Intervention     STM to lateral elbow musculature  15 min                          Access Code: [email or web address]  URL: [email or web address]  Date: 02/27/2023  Prepared by: Jason Dietz    Exercises  Seated Wrist Flexion with Dumbbell - 1-2 x daily - 20 reps  Seated Wrist Extension with Dumbbell - 1-2 x daily - 20 reps  Seated Gripping Towel - 1-2 x daily - 20 reps  Seated Wrist Flexion and Extension with Towel Twist - 1-2 x daily - 20 reps  Forearm Pronation and Supination with Hammer - 1-2 x daily - 20 reps    Therapeutic Exercise and NMR EXR  [x] (07044) Provided verbal/tactile cueing for activities related to strengthening, flexibility, endurance, ROM  for improvements in scapular, scapulothoracic and UE control with self care, reaching, carrying, lifting, house/yardwork, driving/computer work.    [] (39503) Provided verbal/tactile cueing for activities related to improving balance, coordination, kinesthetic sense, posture, motor skill, proprioception  to assist with  scapular, scapulothoracic and UE control with self care, reaching, carrying, lifting, house/yardwork, driving/computer work. Therapeutic Activities:    [] (57352 or 22255) Provided verbal/tactile cueing for activities related to improving balance, coordination, kinesthetic sense, posture, motor skill, proprioception and motor activation to allow for proper function of scapular, scapulothoracic and UE control with self care, carrying, lifting, driving/computer work.      Home Exercise Program:    [x] (29842) Reviewed/Progressed HEP activities related to strengthening, flexibility, endurance, ROM of scapular, scapulothoracic and UE control with self care, reaching, carrying, lifting, house/yardwork, driving/computer work  [] (67926) Reviewed/Progressed HEP activities related to improving balance, coordination, kinesthetic sense, posture, motor skill, proprioception of scapular, scapulothoracic and UE control with self care, reaching, carrying, lifting, house/yardwork, driving/computer work      Manual Treatments:  PROM / STM / Oscillations-Mobs:  G-I, II, III, IV (PA's, Inf., Post.)  [x] (94453) Provided manual therapy to mobilize soft tissue/joints of cervical/CT, scapular GHJ and UE for the purpose of modulating pain, promoting relaxation,  increasing ROM, reducing/eliminating soft tissue swelling/inflammation/restriction, improving soft tissue extensibility and allowing for proper ROM for normal function with self care, reaching, carrying, lifting, house/yardwork, driving/computer work    Modalities:      [] GR/ESU 15 min    [] GR 15 min   [] ESU     [] CP    [] MHP    [] declined    Charges:  Timed Code Treatment Minutes: 38   Total Treatment Minutes: 38     [] EVAL (LOW) 14090 (typically 20 minutes face-to-face)  [] EVAL (MOD) 18613 (typically 30 minutes face-to-face)  [] EVAL (HIGH) 75233 (typically 45 minutes face-to-face)  [] RE-EVAL     [x] TJ(04933) x 2    [] IONTO  [] NMR (26131) x     [] VASO  [x] Manual (17186) x 1  [] Other:  [] TA x      [] Mech Traction (73659)  [] ES(attended) (62004)     [] ES (un) (70889):     Jaun Burgos stated goal: Pain free full use of arm  [] Progressing: [x] Met: [] Not Met: [] Adjusted    Therapist goals for Patient:   Short Term Goals: To be achieved in: 2 weeks  1. Independent in HEP and progression per patient tolerance, in order to prevent re-injury. [] Progressing: [x] Met: [] Not Met: [] Adjusted  2. Patient will have a decrease in pain to facilitate improvement in movement, function, and ADLs as indicated by Functional Deficits. [] Progressing: [x] Met: [] Not Met: [] Adjusted    Long Term Goals: To be achieved in: 12 weeks  1. Disability index score of 13% or less on quick DASH to assist with reaching prior level of function. [] Progressing: [x] Met: [] Not Met: [] Adjusted  2. Patient will demonstrate increased AROM to full and pain free elbow extension to allow for proper joint functioning as indicated by patients Functional Deficits. [] Progressing: [x] Met: [] Not Met: [] Adjusted  3.  Patient will demonstrate an increase in Strength to at least 4+/5 throughout UE to allow for proper functional mobility as indicated by patients Functional Deficits. [] Progressing: [x] Met: [] Not Met: [] Adjusted  4. Patient will return to dressing and hygiene ADLs without increased symptoms or restriction. [] Progressing: [x] Met: [] Not Met: [] Adjusted       Overall Progression Towards Functional goals/ Treatment Progress Update:  [x] Patient is progressing as expected towards functional goals listed. [] Progression is slowed due to complexities/Impairments listed. [] Progression has been slowed due to co-morbidities. [] Plan just implemented, too soon to assess goals progression <30days   [] Goals require adjustment due to lack of progress  [] Patient is not progressing as expected and requires additional follow up with physician  [] Other    Prognosis for POC: [x] Good [] Fair  [] Poor      Patient requires continued skilled intervention: [x] Yes  [] No      ASSESSMENT: The patient was able to tolerate exercises without any pain. The patient was cued to perform exercises in order to perform controled movements slower. The patient has a decrease of overall pain and function. The patient state sthey are functional and does not need therapy at this time. Treatment/Activity Tolerance:  [x] Patient tolerated treatment well [] Patient limited by fatigue  [] Patient limited by pain  [] Patient limited by other medical complications  [] Other:       PLAN:  [] Continue per plan of care [] Alter current plan (see comments above)  [] Plan of care initiated [] Hold pending MD visit [x] Discharge      Electronically signed by:  Jackie Singh, PT , DPT, CSCS    Note: If patient does not return for scheduled/ recommended follow up visits, this note will serve as a discharge from care along with most recent update on progress.

## 2023-03-21 ENCOUNTER — APPOINTMENT (OUTPATIENT)
Dept: PHYSICAL THERAPY | Age: 78
End: 2023-03-21
Payer: MEDICARE

## 2023-03-27 ENCOUNTER — OFFICE VISIT (OUTPATIENT)
Dept: FAMILY MEDICINE CLINIC | Age: 78
End: 2023-03-27
Payer: MEDICARE

## 2023-03-27 VITALS
HEART RATE: 67 BPM | OXYGEN SATURATION: 97 % | WEIGHT: 178 LBS | SYSTOLIC BLOOD PRESSURE: 146 MMHG | DIASTOLIC BLOOD PRESSURE: 86 MMHG | BODY MASS INDEX: 24.83 KG/M2

## 2023-03-27 DIAGNOSIS — H66.92 LEFT ACUTE OTITIS MEDIA: Primary | ICD-10-CM

## 2023-03-27 DIAGNOSIS — H61.23 BILATERAL IMPACTED CERUMEN: ICD-10-CM

## 2023-03-27 DIAGNOSIS — I10 PRIMARY HYPERTENSION: ICD-10-CM

## 2023-03-27 PROCEDURE — 69210 REMOVE IMPACTED EAR WAX UNI: CPT

## 2023-03-27 PROCEDURE — 1036F TOBACCO NON-USER: CPT

## 2023-03-27 PROCEDURE — G8420 CALC BMI NORM PARAMETERS: HCPCS

## 2023-03-27 PROCEDURE — G8484 FLU IMMUNIZE NO ADMIN: HCPCS

## 2023-03-27 PROCEDURE — 99214 OFFICE O/P EST MOD 30 MIN: CPT

## 2023-03-27 PROCEDURE — 1123F ACP DISCUSS/DSCN MKR DOCD: CPT

## 2023-03-27 PROCEDURE — 3079F DIAST BP 80-89 MM HG: CPT

## 2023-03-27 PROCEDURE — 3077F SYST BP >= 140 MM HG: CPT

## 2023-03-27 PROCEDURE — G8427 DOCREV CUR MEDS BY ELIG CLIN: HCPCS

## 2023-03-27 RX ORDER — CEFDINIR 300 MG/1
300 CAPSULE ORAL 2 TIMES DAILY
Qty: 20 CAPSULE | Refills: 0 | Status: SHIPPED | OUTPATIENT
Start: 2023-03-27 | End: 2023-04-06

## 2023-03-27 ASSESSMENT — ENCOUNTER SYMPTOMS
SINUS PAIN: 1
RESPIRATORY NEGATIVE: 1
GASTROINTESTINAL NEGATIVE: 1
SINUS PRESSURE: 1

## 2023-03-27 NOTE — PROGRESS NOTES
appear to have infection to the tympanic membrane and there was no middle ear effusion noted. Patient advised to start on treatment as ordered and follow-up with office if symptoms fail to improve or worsen. - cefdinir (OMNICEF) 300 MG capsule; Take 1 capsule by mouth 2 times daily for 10 days  Dispense: 20 capsule; Refill: 0    2. Bilateral impacted cerumen  See above HPI, see above #1. Performed in office today. - 95533 - TX REMOVE IMPACTED EAR WAX    3. Primary hypertension  BP recheck elevated today at 146/86. Patient has not yet taken BP medication today. Advised taking when he gets home today. Patient was instructed to follow-up in 2 weeks for nurse visit BP check to ensure we do not need to adjust BP treatment plan. 30 spent on patient care today. This document was prepared by a combination of typing and transcription through a voice recognition software.     Maurita Denver, APRN - CNP

## 2023-03-28 ENCOUNTER — APPOINTMENT (OUTPATIENT)
Dept: PHYSICAL THERAPY | Age: 78
End: 2023-03-28
Payer: MEDICARE

## 2023-05-16 ENCOUNTER — OFFICE VISIT (OUTPATIENT)
Dept: ENT CLINIC | Age: 78
End: 2023-05-16
Payer: MEDICARE

## 2023-05-16 ENCOUNTER — PROCEDURE VISIT (OUTPATIENT)
Dept: AUDIOLOGY | Age: 78
End: 2023-05-16
Payer: MEDICARE

## 2023-05-16 VITALS
WEIGHT: 178 LBS | HEIGHT: 71 IN | DIASTOLIC BLOOD PRESSURE: 82 MMHG | OXYGEN SATURATION: 97 % | TEMPERATURE: 97.8 F | BODY MASS INDEX: 24.92 KG/M2 | HEART RATE: 55 BPM | SYSTOLIC BLOOD PRESSURE: 149 MMHG

## 2023-05-16 DIAGNOSIS — H93.13 TINNITUS OF BOTH EARS: ICD-10-CM

## 2023-05-16 DIAGNOSIS — R09.81 NASAL CONGESTION: ICD-10-CM

## 2023-05-16 DIAGNOSIS — H90.3 SENSORINEURAL HEARING LOSS (SNHL), BILATERAL: Primary | ICD-10-CM

## 2023-05-16 DIAGNOSIS — H90.3 SENSORINEURAL HEARING LOSS, BILATERAL: Primary | ICD-10-CM

## 2023-05-16 PROCEDURE — 92557 COMPREHENSIVE HEARING TEST: CPT | Performed by: AUDIOLOGIST

## 2023-05-16 PROCEDURE — G8420 CALC BMI NORM PARAMETERS: HCPCS | Performed by: OTOLARYNGOLOGY

## 2023-05-16 PROCEDURE — 99203 OFFICE O/P NEW LOW 30 MIN: CPT | Performed by: OTOLARYNGOLOGY

## 2023-05-16 PROCEDURE — 1036F TOBACCO NON-USER: CPT | Performed by: OTOLARYNGOLOGY

## 2023-05-16 PROCEDURE — 3077F SYST BP >= 140 MM HG: CPT | Performed by: OTOLARYNGOLOGY

## 2023-05-16 PROCEDURE — 92567 TYMPANOMETRY: CPT | Performed by: AUDIOLOGIST

## 2023-05-16 PROCEDURE — 3079F DIAST BP 80-89 MM HG: CPT | Performed by: OTOLARYNGOLOGY

## 2023-05-16 PROCEDURE — G8427 DOCREV CUR MEDS BY ELIG CLIN: HCPCS | Performed by: OTOLARYNGOLOGY

## 2023-05-16 PROCEDURE — 1123F ACP DISCUSS/DSCN MKR DOCD: CPT | Performed by: OTOLARYNGOLOGY

## 2023-05-16 RX ORDER — CETIRIZINE HYDROCHLORIDE 10 MG/1
10 TABLET ORAL DAILY
Qty: 90 TABLET | Refills: 1 | Status: SHIPPED | OUTPATIENT
Start: 2023-05-16

## 2023-05-16 RX ORDER — CETIRIZINE HYDROCHLORIDE 10 MG/1
10 TABLET ORAL DAILY
COMMUNITY
End: 2023-05-16 | Stop reason: ALTCHOICE

## 2023-05-16 RX ORDER — FLUTICASONE PROPIONATE 50 MCG
1 SPRAY, SUSPENSION (ML) NASAL 2 TIMES DAILY
Qty: 16 G | Refills: 3 | Status: SHIPPED | OUTPATIENT
Start: 2023-05-16

## 2023-05-16 ASSESSMENT — ENCOUNTER SYMPTOMS
ABDOMINAL PAIN: 0
SINUS PRESSURE: 1
WHEEZING: 0
SHORTNESS OF BREATH: 0
SINUS PAIN: 1

## 2023-05-16 NOTE — PROGRESS NOTES
Caroline Ferguson   1945, 68 y.o. male   [id number]       Referring Provider: Kassy Lechuga MD  Referral Type: In an order in [address] [place] [address]    Reason for Visit: Evaluation of the cause of disorders of hearing, tinnitus, or balance. ADULT AUDIOLOGIC EVALUATION      Caroline Ferguson is a 68 y.o. male seen today, 5/16/2023 , for an initial audiologic evaluation. Patient was seen by Kassy Lechuga MD following today's evaluation. AUDIOLOGIC AND OTHER PERTINENT MEDICAL HISTORY:      Caroline Ferguson reports recent sinus congestion and fluctuation in hearing sensitivity. He states approximately 1 month ago, his hearing in his right ear decreased significantly, but has slowly improvement over time. He reports bilateral tinnitus that is mostly non-intrusive to daily activities such as task concentration, relaxation, and/or sleeping. Patient has a history of occupational and/or recreational noise exposure from serving in the [employer]. No other significant otologic history reported. He denied otalgia, otorrhea, dizziness, imbalance, history of falls, history of head trauma, and history of ear surgery. Date: 5/16/2023     IMPRESSIONS:      Today's results revealed a high frequency hearing loss, bilaterally. Excellent speech understanding when in quiet. Tympanometry indicates normal middle ear function. Discussed test results and implications with patient. Discussed use of tinnitus management strategies. Provided basic tinnitus education, including the neurophysiological model. Follow medical recommendations of Kassy Lechuga MD.     ASSESSMENT AND FINDINGS:     Otoscopy unremarkable. RIGHT EAR:  Hearing Sensitivity: Hearing within normal limits through 2kHz sloping to a severe sensorineural hearing loss with a small conductive component at [facility]. Speech Recognition Threshold: 15 dB HL  Word Recognition: Excellent 100%, based on NU-6 25-word list at 55 dBHL using recorded speech stimuli.     Tympanometry: Normal peak

## 2023-05-16 NOTE — PROGRESS NOTES
[facility], [address], [address], [address] [place] [address]  P: [phone]       Patient     Macario Peres  1945    Chief Concern     Chief Complaint   Patient presents with    Hearing Problem     Patient is here today for hearing loss. Patient states a few months ago he was having sinus issues and it moved to his ears. Patient states his ear were clogged for a long time. Patient states his ears are better now since having zyrtec flonase and ear drops. Patient states he has buzzing in both ear for the past 6 months, mostly at night. Patient does have a wax issue as well. Assessment and Plan     {No diagnosis found. (Refresh or delete this SmartLink)}    No follow-ups on file. History of Present Illness     ***    Past Medical History     Past Medical History:   Diagnosis Date    Heart disease     History of prostate cancer     HTN (hypertension)     Kidney stone        Past Surgical History     Past Surgical History:   Procedure Laterality Date    BACK SURGERY      fractures disc (Dr. Jolene Hill at [address]) Vic[place] in back    [address]      LITHOTRIPSY Left 2017    [address]  2018    prostate cancer       Family History     No family history on file.     Social History     Social History     Tobacco Use    Smoking status: Former     Packs/day: 0.50     Years: 3.00     Pack years: 1.50     Types: Cigarettes     Quit date: 1970     Years since quittin.4    Smokeless tobacco: Former   Vaping Use    Vaping Use: Never used   Substance Use Topics    Alcohol use: Never    Drug use: Never        Allergies     Allergies   Allergen Reactions    Statins      Other reaction(s): Pain  Pain in back of legs    Medrol [Methylprednisolone]     Morphine      Kidney stone    Quinine Sulfate [Quinine]        Medications     Current Outpatient Medications   Medication Sig
Constitutional:       Appearance: Normal appearance. HENT:      Head: Normocephalic and atraumatic. Right Ear: Tympanic membrane, ear canal and external ear normal. There is no impacted cerumen. Left Ear: Tympanic membrane, ear canal and external ear normal. There is no impacted cerumen. Ears:      Agrawal exam findings: Does not lateralize. Right Rinne: AC > BC. Left Rinne: AC > BC. Nose: No congestion or rhinorrhea. Mouth/Throat:      Lips: Pink. No lesions. Mouth: Mucous membranes are moist. No oral lesions. Tongue: No lesions. Tongue does not deviate from midline. Palate: No mass and lesions. Pharynx: Oropharynx is clear. Uvula midline. No oropharyngeal exudate or posterior oropharyngeal erythema. Eyes:      Extraocular Movements: Extraocular movements intact. Pupils: Pupils are equal, round, and reactive to light. Musculoskeletal:      Cervical back: Normal range of motion and neck supple. Lymphadenopathy:      Cervical: No cervical adenopathy. Neurological:      Mental Status: He is alert. Cranial Nerves: No cranial nerve deficit. Data Review             Procedure     None    Cheng Peters MD  5/16/23    Portions of this note were dictated using Dragon.  There may be linguistic errors secondary to the use of this program.

## 2023-10-31 ENCOUNTER — OFFICE VISIT (OUTPATIENT)
Dept: FAMILY MEDICINE CLINIC | Age: 78
End: 2023-10-31

## 2023-10-31 VITALS
SYSTOLIC BLOOD PRESSURE: 138 MMHG | BODY MASS INDEX: 25.24 KG/M2 | DIASTOLIC BLOOD PRESSURE: 86 MMHG | WEIGHT: 181 LBS | HEART RATE: 81 BPM | OXYGEN SATURATION: 96 %

## 2023-10-31 DIAGNOSIS — Z09 HOSPITAL DISCHARGE FOLLOW-UP: Primary | ICD-10-CM

## 2023-10-31 DIAGNOSIS — R07.9 CHEST PAIN, UNSPECIFIED TYPE: ICD-10-CM

## 2023-10-31 DIAGNOSIS — F41.9 ANXIETY: ICD-10-CM

## 2023-10-31 RX ORDER — BUSPIRONE HYDROCHLORIDE 10 MG/1
10 TABLET ORAL 2 TIMES DAILY
Qty: 60 TABLET | Refills: 0 | Status: SHIPPED | OUTPATIENT
Start: 2023-10-31 | End: 2023-11-30

## 2023-10-31 ASSESSMENT — ENCOUNTER SYMPTOMS
RESPIRATORY NEGATIVE: 1
GASTROINTESTINAL NEGATIVE: 1

## 2023-10-31 NOTE — PROGRESS NOTES
recent admitted to [facility] for 2 days. Do not have records viewable at this time to review physician notes. We will try to obtain records from [facility].    2. Chest pain, unspecified type  No new symptoms at this time. Per patient it was reported to him that the cardiologist and hospitalist felt that his symptoms were related to anxiety. Is following with cardiology on an outpatient basis to have stress test performed. Follow-up with office as needed. 3. Anxiety  See above HPI. Patient was agreeable to trial BuSpar at this time. Patient was given the option to trial daily medication such as SSRI or SNRI at which time he declined. States he would prefer to try an as needed medication but would consider daily medication if needed if does not improve with as needed BuSpar. Patient following back up for Medicare and wellness visit on 11/13/2023 and we will readdress his anxiety at that time. - busPIRone (BUSPAR) 10 MG tablet; Take 1 tablet by mouth 2 times daily  Dispense: 60 tablet; Refill: 0      This document was prepared by a combination of typing and transcription through a voice recognition software. 30 min spent on pt care.   Jake Palacio, LOBITO - CNP

## 2023-11-13 ENCOUNTER — TELEMEDICINE (OUTPATIENT)
Dept: FAMILY MEDICINE CLINIC | Age: 78
End: 2023-11-13
Payer: MEDICARE

## 2023-11-13 DIAGNOSIS — Z00.00 MEDICARE ANNUAL WELLNESS VISIT, SUBSEQUENT: Primary | ICD-10-CM

## 2023-11-13 PROCEDURE — G0439 PPPS, SUBSEQ VISIT: HCPCS

## 2023-11-13 PROCEDURE — G8484 FLU IMMUNIZE NO ADMIN: HCPCS

## 2023-11-13 PROCEDURE — 1123F ACP DISCUSS/DSCN MKR DOCD: CPT

## 2023-11-13 ASSESSMENT — LIFESTYLE VARIABLES
HOW OFTEN DO YOU HAVE A DRINK CONTAINING ALCOHOL: NEVER
HOW MANY STANDARD DRINKS CONTAINING ALCOHOL DO YOU HAVE ON A TYPICAL DAY: PATIENT DOES NOT DRINK

## 2023-11-13 ASSESSMENT — PATIENT HEALTH QUESTIONNAIRE - PHQ9
SUM OF ALL RESPONSES TO PHQ QUESTIONS 1-9: 0
SUM OF ALL RESPONSES TO PHQ QUESTIONS 1-9: 0
2. FEELING DOWN, DEPRESSED OR HOPELESS: 0
SUM OF ALL RESPONSES TO PHQ QUESTIONS 1-9: 0
SUM OF ALL RESPONSES TO PHQ QUESTIONS 1-9: 0
SUM OF ALL RESPONSES TO PHQ9 QUESTIONS 1 & 2: 0
1. LITTLE INTEREST OR PLEASURE IN DOING THINGS: 0

## 2023-11-13 NOTE — PROGRESS NOTES
Medicare Annual Wellness Visit    Starling Standard is here for Medicare AWV    Assessment & Plan   Medicare annual wellness visit, subsequent  Recommendations for Preventive Services Due: see orders and patient instructions/AVS.  Recommended screening schedule for the next 5-10 years is provided to the patient in written form: see Patient Instructions/AVS.     No follow-ups on file. Subjective   The following acute and/or chronic problems were also addressed today:  Patient seen today through telephone call for Medicare annual wellness visit. Care gaps addressed today. Patient's complete Health Risk Assessment and screening values have been reviewed and are found in Flowsheets. The following problems were reviewed today and where indicated follow up appointments were made and/or referrals ordered. Hearing Screen:  Do you or your family notice any trouble with your hearing that hasn't been managed with hearing aids?: (!) Yes (saw hearing specialist)    Interventions:  Patient declines any further evaluation or treatment                 Objective      Patient-Reported Vitals  Patient-Reported Systolic (Top): 736 mmHg (11/17)  Patient-Reported Diastolic (Bottom): 71 mmHg (11/17)  BP Observations: No, remote/electronic monitoring device was not used or able to be verified  Patient-Reported Weight: 181  Patient-Reported Height: 6ft       No physical exam performed today. This was a telephone call Medicare annual wellness exam.      Allergies   Allergen Reactions    Statins      Other reaction(s): Pain  Pain in back of legs    Medrol [Methylprednisolone]     Morphine      Kidney stone    Quinine Sulfate [Quinine]      Prior to Visit Medications    Medication Sig Taking?  Authorizing Provider   busPIRone (BUSPAR) 10 MG tablet Take 1 tablet by mouth 2 times daily  Patient taking differently: Take 0.5 tablets by mouth 2 times daily Yes LOBITO Dillon - CNP   cetirizine (ZYRTEC) 10 MG tablet

## 2024-04-04 ENCOUNTER — HOSPITAL ENCOUNTER (OUTPATIENT)
Age: 79
Discharge: HOME | End: 2024-04-04
Payer: MEDICARE

## 2024-04-04 ENCOUNTER — HOSPITAL ENCOUNTER (OUTPATIENT)
Dept: GENERAL RADIOLOGY | Age: 79
Discharge: HOME | End: 2024-04-04
Payer: MEDICARE

## 2024-04-04 DIAGNOSIS — R35.0 FREQUENCY OF MICTURITION: ICD-10-CM

## 2024-04-04 PROCEDURE — 74018 RADEX ABDOMEN 1 VIEW: CPT

## 2024-05-09 ENCOUNTER — HOSPITAL ENCOUNTER (OUTPATIENT)
Age: 79
Discharge: HOME | End: 2024-05-09
Payer: MEDICARE

## 2024-05-09 ENCOUNTER — HOSPITAL ENCOUNTER (OUTPATIENT)
Dept: GENERAL RADIOLOGY | Age: 79
Discharge: HOME | End: 2024-05-09
Payer: MEDICARE

## 2024-05-09 DIAGNOSIS — N20.0 RENAL CALCULUS, LEFT: ICD-10-CM

## 2024-05-09 PROCEDURE — 74018 RADEX ABDOMEN 1 VIEW: CPT

## 2024-05-13 NOTE — PATIENT INSTRUCTIONS
-Stop sánchez-synephrine  -Start flonase nightly  -Consider hearing aids if you begin to have difficulty with conversations, using the phone, or in noisy environments    Instructions for Sinus Rinses/Nasal Sprays  -Start hypertonic nasal saline rinses saline twice daily - use distilled water, or water that you have boiled (and that has cooled to room temperature). Use a salt packet in the sinus rinse bottle.  When performing rinses, please stand over your sink with your chin tucked into your chest. Use 4oz of the bottle in the left nasal passage, and 4oz in the right nasal passage  -Start topical nasal steroid sprays twice daily 15-20 minutes after sinus rinses - again, tuck chin to chest when using sprays and place the spray tip of the bottle in the nose, with the tip oriented toward the ceiling   -May continue zyrtec
unk

## 2024-07-15 NOTE — PATIENT INSTRUCTIONS

## 2024-12-09 ENCOUNTER — OFFICE VISIT (OUTPATIENT)
Dept: FAMILY MEDICINE CLINIC | Age: 79
End: 2024-12-09

## 2024-12-09 VITALS
BODY MASS INDEX: 25.06 KG/M2 | SYSTOLIC BLOOD PRESSURE: 152 MMHG | HEIGHT: 71 IN | HEART RATE: 76 BPM | DIASTOLIC BLOOD PRESSURE: 74 MMHG | OXYGEN SATURATION: 92 % | WEIGHT: 179 LBS

## 2024-12-09 DIAGNOSIS — I10 PRIMARY HYPERTENSION: ICD-10-CM

## 2024-12-09 DIAGNOSIS — H26.9 CATARACT OF RIGHT EYE, UNSPECIFIED CATARACT TYPE: ICD-10-CM

## 2024-12-09 DIAGNOSIS — I25.10 CORONARY ARTERY DISEASE INVOLVING NATIVE CORONARY ARTERY OF NATIVE HEART WITHOUT ANGINA PECTORIS: ICD-10-CM

## 2024-12-09 DIAGNOSIS — Z01.818 PREOP EXAMINATION: Primary | ICD-10-CM

## 2024-12-09 SDOH — ECONOMIC STABILITY: FOOD INSECURITY: WITHIN THE PAST 12 MONTHS, YOU WORRIED THAT YOUR FOOD WOULD RUN OUT BEFORE YOU GOT MONEY TO BUY MORE.: NEVER TRUE

## 2024-12-09 SDOH — ECONOMIC STABILITY: FOOD INSECURITY: WITHIN THE PAST 12 MONTHS, THE FOOD YOU BOUGHT JUST DIDN'T LAST AND YOU DIDN'T HAVE MONEY TO GET MORE.: NEVER TRUE

## 2024-12-09 SDOH — ECONOMIC STABILITY: INCOME INSECURITY: HOW HARD IS IT FOR YOU TO PAY FOR THE VERY BASICS LIKE FOOD, HOUSING, MEDICAL CARE, AND HEATING?: NOT HARD AT ALL

## 2024-12-09 ASSESSMENT — PATIENT HEALTH QUESTIONNAIRE - PHQ9
SUM OF ALL RESPONSES TO PHQ9 QUESTIONS 1 & 2: 0
1. LITTLE INTEREST OR PLEASURE IN DOING THINGS: NOT AT ALL
SUM OF ALL RESPONSES TO PHQ QUESTIONS 1-9: 0
2. FEELING DOWN, DEPRESSED OR HOPELESS: NOT AT ALL
SUM OF ALL RESPONSES TO PHQ QUESTIONS 1-9: 0

## 2024-12-09 ASSESSMENT — ENCOUNTER SYMPTOMS: SHORTNESS OF BREATH: 0

## 2024-12-09 NOTE — PROGRESS NOTES
Shashank Ortega  YOB: 1945    Date of Service:  12/9/2024      Wt Readings from Last 2 Encounters:   12/09/24 81.2 kg (179 lb)   10/31/23 82.1 kg (181 lb)       BP Readings from Last 3 Encounters:   12/09/24 (!) 152/74   10/31/23 138/86   05/16/23 (!) 149/82        Chief Complaint   Patient presents with    Pre-op Exam     Patient is having cataract surgery on his right eye on 12/19/24 by Dr. Sanchez at [facility].       Allergies   Allergen Reactions    Statins      Other reaction(s): Pain  Pain in back of legs    Medrol [Methylprednisolone]     Morphine      Kidney stone    Quinine Sulfate [Quinine]        Outpatient Medications Marked as Taking for the 12/9/24 encounter (Office Visit) with Logan Bryant MD   Medication Sig Dispense Refill    fluticasone (FLONASE) 50 MCG/ACT nasal spray 1 spray by Each Nostril route in the morning and at bedtime 16 g 3    Cyanocobalamin (VITAMIN B-12 PO) Take by mouth      amLODIPine-benazepril (LOTREL) 5-10 MG per capsule Take 1 capsule by mouth daily      Omega-3 Fatty Acids (FISH OIL) 1000 MG CAPS Take 3 capsules by mouth 2 times daily      NONFORMULARY Take by mouth daily NERVE RENEW  Contains:  B-1, B-12 2000 mcg, R-Alpha Lopic Acid 150 mg, B-6 4 mg, Riboflavin 4 mg, Vitamin D 500 u, Benfodaime 300 mg, Proprietary Blend 43 mg      aspirin 81 MG chewable tablet Take 1 tablet by mouth once a week      gabapentin (NEURONTIN) 400 MG capsule 1 capsule 2 times daily.      Multiple Vitamins-Minerals (CENTRUM SILVER PO) Take by mouth      Garlic 1000 MG CAPS Take by mouth       Red Yeast Rice Extract (RED YEAST RICE PO) Take by mouth once a week 2 times a week      Coenzyme Q10 (CO Q-10) 50 MG CAPS Take 2 capsules by mouth daily         This patient presents to the office today for a preoperative consultation at the request of surgeon, Dr. Sanchez, who plans on performing cataract surgery on December 19 at [facility].  The current problem began 6 years ago, and symptoms have been

## 2024-12-11 RX ORDER — CYCLOPENT/TROPIC/PHEN/KETR/WAT 1 %-1%-10%
0.3 DROPS (EA) OPHTHALMIC (EYE) EVERY 10 MIN PRN
Status: CANCELLED | OUTPATIENT
Start: 2024-12-19

## 2024-12-11 NOTE — PROGRESS NOTES

## 2024-12-13 ENCOUNTER — ANESTHESIA EVENT (OUTPATIENT)
Dept: OPERATING ROOM | Age: 79
End: 2024-12-13
Payer: MEDICARE

## 2024-12-19 ENCOUNTER — HOSPITAL ENCOUNTER (OUTPATIENT)
Age: 79
Setting detail: OUTPATIENT SURGERY
Discharge: HOME | End: 2024-12-19
Attending: OPHTHALMOLOGY | Admitting: OPHTHALMOLOGY
Payer: MEDICARE

## 2024-12-19 ENCOUNTER — ANESTHESIA (OUTPATIENT)
Dept: OPERATING ROOM | Age: 79
End: 2024-12-19
Payer: MEDICARE

## 2024-12-19 VITALS
OXYGEN SATURATION: 97 % | DIASTOLIC BLOOD PRESSURE: 89 MMHG | TEMPERATURE: 97.1 F | HEART RATE: 63 BPM | RESPIRATION RATE: 16 BRPM | SYSTOLIC BLOOD PRESSURE: 157 MMHG | WEIGHT: 179 LBS | BODY MASS INDEX: 25.06 KG/M2 | HEIGHT: 71 IN

## 2024-12-19 PROCEDURE — 6370000000 HC RX 637 (ALT 250 FOR IP): Performed by: OPHTHALMOLOGY

## 2024-12-19 PROCEDURE — 7100000010 HC PHASE II RECOVERY - FIRST 15 MIN: Performed by: OPHTHALMOLOGY

## 2024-12-19 PROCEDURE — 2500000003 HC RX 250 WO HCPCS: Performed by: OPHTHALMOLOGY

## 2024-12-19 PROCEDURE — 3600000013 HC SURGERY LEVEL 3 ADDTL 15MIN: Performed by: OPHTHALMOLOGY

## 2024-12-19 PROCEDURE — 7100000011 HC PHASE II RECOVERY - ADDTL 15 MIN: Performed by: OPHTHALMOLOGY

## 2024-12-19 PROCEDURE — 3700000001 HC ADD 15 MINUTES (ANESTHESIA): Performed by: OPHTHALMOLOGY

## 2024-12-19 PROCEDURE — 2500000003 HC RX 250 WO HCPCS: Performed by: ANESTHESIOLOGY

## 2024-12-19 PROCEDURE — 2580000003 HC RX 258: Performed by: ANESTHESIOLOGY

## 2024-12-19 PROCEDURE — 3700000000 HC ANESTHESIA ATTENDED CARE: Performed by: OPHTHALMOLOGY

## 2024-12-19 PROCEDURE — 2709999900 HC NON-CHARGEABLE SUPPLY: Performed by: OPHTHALMOLOGY

## 2024-12-19 PROCEDURE — 3600000003 HC SURGERY LEVEL 3 BASE: Performed by: OPHTHALMOLOGY

## 2024-12-19 PROCEDURE — 6360000002 HC RX W HCPCS: Performed by: NURSE ANESTHETIST, CERTIFIED REGISTERED

## 2024-12-19 PROCEDURE — 6360000002 HC RX W HCPCS: Performed by: OPHTHALMOLOGY

## 2024-12-19 PROCEDURE — V2632 POST CHMBR INTRAOCULAR LENS: HCPCS | Performed by: OPHTHALMOLOGY

## 2024-12-19 DEVICE — LENS CLAREON IOL SY60WF 16.0D: Type: IMPLANTABLE DEVICE | Site: EYE | Status: FUNCTIONAL

## 2024-12-19 RX ORDER — BROMFENAC 1.03 MG/ML
1 SOLUTION/ DROPS OPHTHALMIC ONCE
Status: COMPLETED | OUTPATIENT
Start: 2024-12-19 | End: 2024-12-19

## 2024-12-19 RX ORDER — PREDNISOLONE ACETATE 10 MG/ML
1 SUSPENSION/ DROPS OPHTHALMIC CONTINUOUS
Status: DISCONTINUED | OUTPATIENT
Start: 2024-12-19 | End: 2024-12-19 | Stop reason: HOSPADM

## 2024-12-19 RX ORDER — PREDNISOLONE ACETATE 10 MG/ML
SUSPENSION/ DROPS OPHTHALMIC PRN
Status: DISCONTINUED | OUTPATIENT
Start: 2024-12-19 | End: 2024-12-19 | Stop reason: ALTCHOICE

## 2024-12-19 RX ORDER — TETRACAINE HYDROCHLORIDE 5 MG/ML
SOLUTION OPHTHALMIC PRN
Status: DISCONTINUED | OUTPATIENT
Start: 2024-12-19 | End: 2024-12-19 | Stop reason: ALTCHOICE

## 2024-12-19 RX ORDER — DEXTROSE MONOHYDRATE 100 MG/ML
INJECTION, SOLUTION INTRAVENOUS CONTINUOUS PRN
Status: DISCONTINUED | OUTPATIENT
Start: 2024-12-19 | End: 2024-12-19

## 2024-12-19 RX ORDER — SODIUM CHLORIDE 9 MG/ML
INJECTION, SOLUTION INTRAVENOUS PRN
Status: DISCONTINUED | OUTPATIENT
Start: 2024-12-19 | End: 2024-12-19 | Stop reason: HOSPADM

## 2024-12-19 RX ORDER — PROPOFOL 10 MG/ML
INJECTION, EMULSION INTRAVENOUS
Status: DISCONTINUED | OUTPATIENT
Start: 2024-12-19 | End: 2024-12-19 | Stop reason: SDUPTHER

## 2024-12-19 RX ORDER — GLUCAGON 1 MG/ML
1 KIT INJECTION PRN
Status: DISCONTINUED | OUTPATIENT
Start: 2024-12-19 | End: 2024-12-19

## 2024-12-19 RX ORDER — TOBRAMYCIN AND DEXAMETHASONE 3; 1 MG/ML; MG/ML
1 SUSPENSION/ DROPS OPHTHALMIC CONTINUOUS
Status: DISCONTINUED | OUTPATIENT
Start: 2024-12-19 | End: 2024-12-19 | Stop reason: HOSPADM

## 2024-12-19 RX ORDER — SODIUM CHLORIDE, POTASSIUM CHLORIDE, CALCIUM CHLORIDE, MAGNESIUM CHLORIDE, SODIUM ACETATE, AND SODIUM CITRATE 6.4; .75; .48; .3; 3.9; 1.7 MG/ML; MG/ML; MG/ML; MG/ML; MG/ML; MG/ML
SOLUTION IRRIGATION PRN
Status: DISCONTINUED | OUTPATIENT
Start: 2024-12-19 | End: 2024-12-19 | Stop reason: ALTCHOICE

## 2024-12-19 RX ORDER — SODIUM CHLORIDE 0.9 % (FLUSH) 0.9 %
5-40 SYRINGE (ML) INJECTION PRN
Status: DISCONTINUED | OUTPATIENT
Start: 2024-12-19 | End: 2024-12-19 | Stop reason: HOSPADM

## 2024-12-19 RX ORDER — LIDOCAINE HYDROCHLORIDE 20 MG/ML
INJECTION, SOLUTION EPIDURAL; INFILTRATION; INTRACAUDAL; PERINEURAL
Status: DISCONTINUED | OUTPATIENT
Start: 2024-12-19 | End: 2024-12-19 | Stop reason: SDUPTHER

## 2024-12-19 RX ORDER — BROMFENAC 1.03 MG/ML
1 SOLUTION/ DROPS OPHTHALMIC CONTINUOUS
Status: DISCONTINUED | OUTPATIENT
Start: 2024-12-19 | End: 2024-12-19 | Stop reason: HOSPADM

## 2024-12-19 RX ORDER — SODIUM CHLORIDE, SODIUM LACTATE, POTASSIUM CHLORIDE, CALCIUM CHLORIDE 600; 310; 30; 20 MG/100ML; MG/100ML; MG/100ML; MG/100ML
INJECTION, SOLUTION INTRAVENOUS CONTINUOUS
Status: DISCONTINUED | OUTPATIENT
Start: 2024-12-19 | End: 2024-12-19 | Stop reason: HOSPADM

## 2024-12-19 RX ORDER — TOBRAMYCIN AND DEXAMETHASONE 3; 1 MG/ML; MG/ML
SUSPENSION/ DROPS OPHTHALMIC PRN
Status: DISCONTINUED | OUTPATIENT
Start: 2024-12-19 | End: 2024-12-19 | Stop reason: ALTCHOICE

## 2024-12-19 RX ORDER — PILOCARPINE HYDROCHLORIDE 20 MG/ML
SOLUTION/ DROPS OPHTHALMIC PRN
Status: DISCONTINUED | OUTPATIENT
Start: 2024-12-19 | End: 2024-12-19 | Stop reason: ALTCHOICE

## 2024-12-19 RX ORDER — SODIUM CHLORIDE 0.9 % (FLUSH) 0.9 %
5-40 SYRINGE (ML) INJECTION EVERY 12 HOURS SCHEDULED
Status: DISCONTINUED | OUTPATIENT
Start: 2024-12-19 | End: 2024-12-19 | Stop reason: HOSPADM

## 2024-12-19 RX ADMIN — Medication 0.3 ML: at 10:58

## 2024-12-19 RX ADMIN — Medication 0.3 ML: at 10:47

## 2024-12-19 RX ADMIN — Medication 0.3 ML: at 10:37

## 2024-12-19 RX ADMIN — LIDOCAINE HYDROCHLORIDE 60 MG: 20 INJECTION, SOLUTION EPIDURAL; INFILTRATION; INTRACAUDAL; PERINEURAL at 12:09

## 2024-12-19 RX ADMIN — BROMFENAC 1 DROP: 0.9 SOLUTION/ DROPS OPHTHALMIC at 10:37

## 2024-12-19 RX ADMIN — PROPOFOL 50 MG: 10 INJECTION, EMULSION INTRAVENOUS at 12:09

## 2024-12-19 RX ADMIN — FAMOTIDINE 20 MG: 10 INJECTION, SOLUTION INTRAVENOUS at 11:00

## 2024-12-19 RX ADMIN — TOBRAMYCIN AND DEXAMETHASONE 1 DROP: 3; 1 SUSPENSION/ DROPS OPHTHALMIC at 13:04

## 2024-12-19 ASSESSMENT — PAIN - FUNCTIONAL ASSESSMENT
PAIN_FUNCTIONAL_ASSESSMENT: 0-10
PAIN_FUNCTIONAL_ASSESSMENT: ACTIVITIES ARE NOT PREVENTED

## 2024-12-19 NOTE — DISCHARGE INSTRUCTIONS
General Instructions After Surgery  (For One Week)    Tio Sanchez M.D.  Office [phone]  Office  [phone]    1. Tobradex eye drops: one drop every two hours while awake start at _____3:00_____       Continue one drop every two hours until seen in office at ___09:15 on Fri ________    2. Bring all eye medications to the office with you today/tomorrow.    3. It is important to remember not to bend your head below your waist (you can squat or bend down on one knee, keeping your head upright.)    4. DO NOT LIFT objects heavier than 10 pounds, which is equal to a gallon of milk.    5. You can wash your face, but avoid the area around your operated eye. We recommend a partial bath today and tomorrow. When you resume normal bathing, be sure to turn your back in the shower avoiding water and shampoo from running into your operated eye. Avoid washing your hair for the first three days.    6. Lie on the un operated side while sleeping, or on your back.    7. PROTECT YOUR OPERATED EYE Wear your shield at bedtime at all times. During the day be sure to wear your glasses (or sunglasses provided) if you leave home. Remember your glasses will make your vision blurry in your operated eye, but will not in anyway damage your eye.    8. NEVER RUB OR PUSH ON YOUR EYE This is the worst thing that you can do while it is healing.    9. It is normal for the white part of the eye to appear red or bloodshot. DO NOT WORRY, this will clear in several weeks.    10. You should have NO PAIN after surgery. The eye may feel slightly irritated at first and (2) Tylenol may be used. If you have actual pain, increasing discomfort or sudden decrease in vision call us immediately.    11. Temporary \"floaters\" are not uncommon immediately after surgery, these will disappear. Your vision should gradually improve. Notify us immediately if you notice a shower of new floaters or flashes develop.     ANESTHESIA DISCHARGE INSTRUCTIONS    You are  Patient notified via MyChart:Urine culture no growth.

## 2024-12-19 NOTE — ANESTHESIA POSTPROCEDURE EVALUATION
10/23/2023 12:45 AM        K                        3.9                 10/23/2023 12:45 AM        CL                       102                 10/23/2023 12:45 AM        CO2                      28                  10/23/2023 12:45 AM        BUN                      23 (H)              10/23/2023 12:45 AM        CREATININE               1.3 (H)             10/23/2023 12:45 AM        GLUCOSE                  108 (H)             10/23/2023 12:45 AM   COAGS  Lab Results       Component                Value               Date/Time                  PROTIME                  10.4                10/23/2023 12:45 AM        INR                      1.01 (L)            10/23/2023 12:45 AM        APTT                     28.6                10/23/2023 12:45 AM     Intake & Output:  @24HRIO@    Nausea & Vomiting:  No    Level of Consciousness:  Awake    Pain Assessment:  Adequate analgesia    Anesthesia Complications:  No apparent anesthetic complications    SUMMARY      Vital signs stable  OK to discharge from Stage I post anesthesia care.  Care transferred from Anesthesiology department on discharge from perioperative area     No notable events documented.

## 2024-12-19 NOTE — OP NOTE
OPERATIVE NOTE    Patient Name   Date of Birth Age  MRNIsrrael Ortega   1945   79 y.o.  [id number]       Surgeon        Surgery Date  Tio Sanchez MD        12/19/2024       Preoperative Diagnosis:  Nuclear Sclerotic Cataract right eye    Postoperative Diagnosis: Nuclear Sclerotic Cataract right eye    Operative Procedure: Phacoemulsification/ Posterior Chamber Intraocular Lens Implantation          Anesthesia:   Peribulbar Block with MAC    Details of Procedure:    The patient was brought to the operating room on the operative stretcher in the supine position with the head resting in the head rest.  After appropriate anesthesia monitoring devices were applied, IV sedation was carried out per the anesthesia service.  Once sedation was achieved, a peribulbar block was performed, using a 5 mL combination solution containing 4 mL of 2% lidocaine with 1 mL of Vitrase.  Approximately 2-3 mL of this solution was administered in a peribulbar fashion through the lower lid of the operative eye.    Once appropriate akinesia and anesthesia were demonstrated, the operative eye was prepped and draped in the usual sterile fashion.  Tobradex drops and Tetracain drops were administered.  A wire lid speculum was then inserted into the operative eye.  A paracentesis was then performed using a 15 degree supersharp blade to enter the globe at the limbus, and a .12 mm colibri forceps was used to stabilize the globe.  Viscoat was then instilled into the anterior chamber.  A temporal clear cornea groove was then created using the 15 degree supersharp blade.  The cornea was then entered using the Deni 2.4 mm clearcut keratome blade.  The capsulotomy was then performed using a cystotome, and the capsulorrhexis was completed using uttrata forceps.  The nucleus of the cataract was then hydrodissected and hydrodelineated using sterile BSS on a 27 gauge cannula.  The nucleus of the cataract was then removed using the

## 2024-12-19 NOTE — ANESTHESIA PRE PROCEDURE
Department of Anesthesiology  Preprocedure Note       Name:  Shashank Ortega   Age:  79 y.o.  :  1945                                          MRN:  [id number]         Date:  2024      Surgeon: Surgeon(s):  Tio Sanchez MD    Procedure: Procedure(s):  PHACO EMULSIFICATION OF CATARACT WITH INTRAOCULAR LENS IMPLANT RIGHT EYE, MAC    Medications prior to admission:   Prior to Admission medications    Medication Sig Start Date End Date Taking? Authorizing Provider   fluticasone (FLONASE) 50 MCG/ACT nasal spray 1 spray by Each Nostril route in the morning and at bedtime 23  Yes Modesto Mcmillan MD   Cyanocobalamin (VITAMIN B-12 PO) Take by mouth   Yes Lea Jane MD   amLODIPine-benazepril (LOTREL) 5-10 MG per capsule Take 1 capsule by mouth daily   Yes Lea Jane MD   Omega-3 Fatty Acids (FISH OIL) 1000 MG CAPS Take 3 capsules by mouth 2 times daily   Yes Lea Jane MD   NONFORMULARY Take by mouth daily NERVE RENEW  Contains:  B-1, B-12 2000 mcg, R-Alpha Lopic Acid 150 mg, B-6 4 mg, Riboflavin 4 mg, Vitamin D 500 u, Benfodaime 300 mg, Proprietary Blend 43 mg   Yes Lea Jane MD   aspirin 81 MG chewable tablet Take 1 tablet by mouth once a week   Yes Lea Jane MD   gabapentin (NEURONTIN) 400 MG capsule 1 capsule 2 times daily. 16  Yes Lea Jane MD   Multiple Vitamins-Minerals (CENTRUM SILVER PO) Take by mouth   Yes Lea Jane MD   Garlic 1000 MG CAPS Take by mouth    Yes Lea Jane MD   Red Yeast Rice Extract (RED YEAST RICE PO) Take by mouth once a week 2 times a week   Yes Lea Jane MD   Coenzyme Q10 (CO Q-10) 50 MG CAPS Take 2 capsules by mouth daily   Yes Lea Jane MD   hydrocortisone 2.5 % cream Apply topically 2 times daily Apply topically 2 times daily.  Patient not taking: Reported on 2023   Valentina Regalado, APRN - CNP       Current medications:    Current

## 2024-12-19 NOTE — H&P
Surgical Service History and Physical    CHIEF COMPLAINT:   Decreased Vision and Glare    Reason for Admission:  Cataract Surgery    History Obtained From:  Patient    HISTORY OF PRESENT ILLNESS:  Pt has noticed painless progressive loss of vision and is experiencing functional difficulty.          Past Medical History:        Diagnosis Date    Heart disease     History of prostate cancer     HTN (hypertension)     Kidney stone        Past Surgical History:        Procedure Laterality Date    BACK SURGERY      fractures disc (Dr. Martinez at [facility]) Vicente in back    CARDIAC SURGERY      CORONARY ANGIOPLASTY WITH STENT PLACEMENT      CYSTOSCOPY      KIDNEY STONE SURGERY      LITHOTRIPSY Left 12/13/2017    [facility]    PROSTATECTOMY  2018    prostate cancer       Allergies:  Statins, Medrol [methylprednisolone], Morphine, and Quinine sulfate [quinine]    Prior to Admission medications    Medication Sig Start Date End Date Taking? Authorizing Provider   fluticasone (FLONASE) 50 MCG/ACT nasal spray 1 spray by Each Nostril route in the morning and at bedtime 5/16/23  Yes Modesto Mcmillan MD   Cyanocobalamin (VITAMIN B-12 PO) Take by mouth   Yes Lea Jane MD   amLODIPine-benazepril (LOTREL) 5-10 MG per capsule Take 1 capsule by mouth daily   Yes Lea Jane MD   Omega-3 Fatty Acids (FISH OIL) 1000 MG CAPS Take 3 capsules by mouth 2 times daily   Yes Lea Jane MD   NONFORMULARY Take by mouth daily NERVE RENEW  Contains:  B-1, B-12 2000 mcg, R-Alpha Lopic Acid 150 mg, B-6 4 mg, Riboflavin 4 mg, Vitamin D 500 u, Benfodaime 300 mg, Proprietary Blend 43 mg   Yes Lea Jane MD   aspirin 81 MG chewable tablet Take 1 tablet by mouth once a week   Yes Lea Jane MD   gabapentin (NEURONTIN) 400 MG capsule 1 capsule 2 times daily. 9/20/16  Yes Lea Jane MD   Multiple Vitamins-Minerals (CENTRUM SILVER PO) Take by mouth   Yes Lea Jane MD

## 2025-01-17 SDOH — ECONOMIC STABILITY: FOOD INSECURITY: WITHIN THE PAST 12 MONTHS, YOU WORRIED THAT YOUR FOOD WOULD RUN OUT BEFORE YOU GOT MONEY TO BUY MORE.: NEVER TRUE

## 2025-01-17 SDOH — HEALTH STABILITY: PHYSICAL HEALTH: ON AVERAGE, HOW MANY DAYS PER WEEK DO YOU ENGAGE IN MODERATE TO STRENUOUS EXERCISE (LIKE A BRISK WALK)?: 1 DAY

## 2025-01-17 SDOH — ECONOMIC STABILITY: INCOME INSECURITY: IN THE LAST 12 MONTHS, WAS THERE A TIME WHEN YOU WERE NOT ABLE TO PAY THE MORTGAGE OR RENT ON TIME?: NO

## 2025-01-17 SDOH — ECONOMIC STABILITY: FOOD INSECURITY: WITHIN THE PAST 12 MONTHS, THE FOOD YOU BOUGHT JUST DIDN'T LAST AND YOU DIDN'T HAVE MONEY TO GET MORE.: NEVER TRUE

## 2025-01-17 SDOH — ECONOMIC STABILITY: TRANSPORTATION INSECURITY
IN THE PAST 12 MONTHS, HAS THE LACK OF TRANSPORTATION KEPT YOU FROM MEDICAL APPOINTMENTS OR FROM GETTING MEDICATIONS?: NO

## 2025-01-17 SDOH — ECONOMIC STABILITY: TRANSPORTATION INSECURITY
IN THE PAST 12 MONTHS, HAS LACK OF TRANSPORTATION KEPT YOU FROM MEETINGS, WORK, OR FROM GETTING THINGS NEEDED FOR DAILY LIVING?: NO

## 2025-01-17 SDOH — HEALTH STABILITY: PHYSICAL HEALTH: ON AVERAGE, HOW MANY MINUTES DO YOU ENGAGE IN EXERCISE AT THIS LEVEL?: 10 MIN

## 2025-01-17 ASSESSMENT — PATIENT HEALTH QUESTIONNAIRE - PHQ9
SUM OF ALL RESPONSES TO PHQ QUESTIONS 1-9: 2
2. FEELING DOWN, DEPRESSED OR HOPELESS: SEVERAL DAYS
SUM OF ALL RESPONSES TO PHQ QUESTIONS 1-9: 2
1. LITTLE INTEREST OR PLEASURE IN DOING THINGS: SEVERAL DAYS
SUM OF ALL RESPONSES TO PHQ QUESTIONS 1-9: 2
SUM OF ALL RESPONSES TO PHQ QUESTIONS 1-9: 2
SUM OF ALL RESPONSES TO PHQ9 QUESTIONS 1 & 2: 2

## 2025-01-17 ASSESSMENT — LIFESTYLE VARIABLES
HOW OFTEN DO YOU HAVE A DRINK CONTAINING ALCOHOL: NEVER
HOW OFTEN DO YOU HAVE SIX OR MORE DRINKS ON ONE OCCASION: 1
HOW MANY STANDARD DRINKS CONTAINING ALCOHOL DO YOU HAVE ON A TYPICAL DAY: PATIENT DOES NOT DRINK
HOW MANY STANDARD DRINKS CONTAINING ALCOHOL DO YOU HAVE ON A TYPICAL DAY: 0
HOW OFTEN DO YOU HAVE A DRINK CONTAINING ALCOHOL: 1

## 2025-01-20 ENCOUNTER — TELEMEDICINE (OUTPATIENT)
Dept: FAMILY MEDICINE CLINIC | Age: 80
End: 2025-01-20

## 2025-01-20 DIAGNOSIS — Z00.00 MEDICARE ANNUAL WELLNESS VISIT, SUBSEQUENT: Primary | ICD-10-CM

## 2025-01-20 NOTE — PROGRESS NOTES
Medicare Annual Wellness Visit    Shashank Ortega is here for Medicare AWV    Assessment & Plan   Medicare annual wellness visit, subsequent     Return in 1 year (on 1/20/2026) for Medicare AWV.     Subjective   Patient stated that he is a  [facility] will [facility] and bring them to this office.    Patient's complete Health Risk Assessment and screening values have been reviewed and are found in Flowsheets. The following problems were reviewed today and where indicated follow up appointments were made and/or referrals ordered.    Positive Risk Factor Screenings with Interventions:              Inactivity:  On average, how many days per week do you engage in moderate to strenuous exercise (like a brisk walk)?: 1 day (!) Abnormal  On average, how many minutes do you engage in exercise at this level?: 10 min  Interventions:  See AVS for additional education material                         Objective    Patient-Reported Vitals  No data recorded               Allergies   Allergen Reactions    Statins      Other reaction(s): Pain  Pain in back of legs    Medrol [Methylprednisolone]      Makes him hyper    Morphine      Kidney stone    Quinine Sulfate [Quinine]      Prior to Visit Medications    Medication Sig Taking? Authorizing Provider   fluticasone (FLONASE) 50 MCG/ACT nasal spray 1 spray by Each Nostril route in the morning and at bedtime Yes Modesto Mcmillan MD   Cyanocobalamin (VITAMIN B-12 PO) Take by mouth Yes Lea Jane MD   hydrocortisone 2.5 % cream Apply topically 2 times daily Apply topically 2 times daily. Yes Valentina Regalado, APRN - CNP   amLODIPine-benazepril (LOTREL) 5-10 MG per capsule Take 1 capsule by mouth daily Yes Lea Jane MD   Omega-3 Fatty Acids (FISH OIL) 1000 MG CAPS Take 3 capsules by mouth 2 times daily Takes 1 capsule daily Yes Lea Jane MD   NONFORMULARY Take by mouth daily NERVE

## 2025-01-20 NOTE — PATIENT INSTRUCTIONS
instruction, always ask your healthcare professional. [employer], disclaims any warranty or liability for your use of this information.         Learning About Being Active as an Older Adult  Why is being active important as you get older?     Being active is one of the best things you can do for your health. And it's never too late to start. Being active--or getting active, if you aren't already--has definite benefits. It can:  Give you more energy,  Keep your mind sharp.  Improve balance to reduce your risk of falls.  Help you manage chronic illness with fewer medicines.  No matter how old you are, how fit you are, or what health problems you have, there is a form of activity that will work for you. And the more physical activity you can do, the better your overall health will be.  What kinds of activity can help you stay healthy?  Being more active will make your daily activities easier. Physical activity includes planned exercise and things you do in daily life. There are four types of activity:  Aerobic.  Doing aerobic activity makes your heart and lungs strong.  Includes walking, dancing, and gardening.  Aim for at least 2½ hours spread throughout the week.  It improves your energy and can help you sleep better.  Muscle-strengthening.  This type of activity can help maintain muscle and strengthen bones.  Includes climbing stairs, using resistance bands, and lifting or carrying heavy loads.  Aim for at least twice a week.  It can help protect the knees and other joints.  Stretching.  Stretching gives you better range of motion in joints and muscles.  Includes upper arm stretches, calf stretches, and gentle yoga.  Aim for at least twice a week, preferably after your muscles are warmed up from other activities.  It can help you function better in daily life.  Balancing.  This helps you stay coordinated and have good posture.  Includes heel-to-toe walking, starr chi, and certain types of yoga.  Aim for at

## 2025-02-14 NOTE — PROGRESS NOTES
Medicare Annual Wellness Visit    Faby Parmar is here for [facility] AWV    Assessment & Plan   Medicare annual wellness visit, subsequent      Recommendations for Preventive Services Due: see orders and patient instructions/AVS.  Recommended screening schedule for the next 5-10 years is provided to the patient in written form: see Patient Instructions/AVS.     Return for Medicare Annual Wellness Visit in 1 year. Subjective   Is going to have all of his blood work through [place] on August 1st.     Is due for PSA but is following Urology r/t hx of prostate cancer and Kidney stone. Is following up with Urology 7/8/22. Sees  in [facility]. Urology of [facility]? Pt in a hurry today. His sister passed away a few days ago and has to be at the visitation in two hours. Patient's complete Health Risk Assessment and screening values have been reviewed and are found in Flowsheets. The following problems were reviewed today and where indicated follow up appointments were made and/or referrals ordered. Positive Risk Factor Screenings with Interventions:             General Health and ACP:  General  In general, how would you say your health is?: Good  In the past 7 days, have you experienced any of the following: New or Increased Pain, New or Increased Fatigue, Loneliness, Social Isolation, Stress or Anger?: No  Do you get the social and emotional support that you need?: Yes  Do you have a Living Will?: Yes    Advance Directives     Power of  Living Will ACP-Advance Directive ACP-Power of     Not on File Not on File Not on File Not on File      General Health Risk Interventions:  · will have living will placed on file with [facility]              Objective   Vitals:    06/30/22 1031   BP: 120/78   Pulse: 77   SpO2: 97%   Weight: 178 lb (80.7 kg)   Height: 5' 9.5\" (1.765 m)      Body mass index is 25.91 kg/m².         General Appearance: alert and oriented to person, place and time, Subjective   History of Present Illness  Patient presents to the emergency department for globus sensation.  Says for the past she has felt like something has been stuck in her throat.  Says she can breathe but it feels difficult to swallow pills.  She denies nausea or vomiting no known allergies.  She has been prescribed steroids for bronchitis but has not taken any as of yet.  She denies any chest pain shortness of breath or other symptom    History provided by:  Patient   used: No        Review of Systems   HENT:          Globus sensation   All other systems reviewed and are negative.      Past Medical History:   Diagnosis Date    Hypertension        No Known Allergies    Past Surgical History:   Procedure Laterality Date     SECTION      two    FINGER SURGERY Right     Trigger finger       No family history on file.    Social History     Socioeconomic History    Marital status:    Tobacco Use    Smoking status: Never    Smokeless tobacco: Never   Substance and Sexual Activity    Alcohol use: Yes     Comment: social    Drug use: No    Sexual activity: Defer           Objective   Physical Exam  Vitals and nursing note reviewed.   HENT:      Head: Normocephalic and atraumatic.      Nose: Nose normal.      Mouth/Throat:      Mouth: Mucous membranes are moist.      Pharynx: Oropharynx is clear.      Comments: Normal voice  Eyes:      Extraocular Movements: Extraocular movements intact.      Pupils: Pupils are equal, round, and reactive to light.   Neck:      Vascular: No carotid bruit.   Cardiovascular:      Rate and Rhythm: Normal rate and regular rhythm.      Pulses: Normal pulses.      Heart sounds: Normal heart sounds.   Pulmonary:      Effort: Pulmonary effort is normal. No respiratory distress.      Breath sounds: Normal breath sounds. No stridor. No wheezing, rhonchi or rales.   Chest:      Chest wall: No tenderness.   Abdominal:      General: There is no distension.       Palpations: Abdomen is soft.      Tenderness: There is no abdominal tenderness. There is no guarding or rebound.   Musculoskeletal:         General: No swelling, tenderness, deformity or signs of injury. Normal range of motion.      Cervical back: Normal range of motion. No rigidity or tenderness.   Lymphadenopathy:      Cervical: No cervical adenopathy.   Skin:     General: Skin is warm and dry.      Capillary Refill: Capillary refill takes less than 2 seconds.   Neurological:      General: No focal deficit present.      Mental Status: She is alert and oriented to person, place, and time.      Cranial Nerves: No cranial nerve deficit.   Psychiatric:         Mood and Affect: Mood normal.         Behavior: Behavior normal.         Procedures           ED Course                                           Medical Decision Making  Hemodynamically stable and afebrile.  Patient has no evidence of airway compromise.  Radiograph of the neck shows no evidence of epiglottitis or other abnormalities.  She is able to swallow without difficulty.  Given return precautions care instructions discharged    Problems Addressed:  Globus pharyngeus: complicated acute illness or injury    Amount and/or Complexity of Data Reviewed  Radiology: ordered. Decision-making details documented in ED Course.    Risk  Prescription drug management.        Final diagnoses:   Globus pharyngeus       ED Disposition  ED Disposition       ED Disposition   Discharge    Condition   Stable    Comment   --               Carlos pApiah MD  [address]  [address]  [place] [address]  [phone]    Schedule an appointment as soon as possible for a visit   As needed    [facility] EMERGENCY DEPARTMENT [place]  [address]  [facility] [id number] [phone]  Go to   As needed         Medication List      No changes were made to your prescriptions during this visit.          well-developed and well-nourished, in no acute distress  Pulmonary/Chest: clear to auscultation bilaterally- no wheezes, rales or rhonchi, normal air movement, no respiratory distress  Cardiovascular: normal rate, normal S1 and S2, no gallops, intact distal pulses and no carotid bruits  Abdomen: soft, non-tender, non-distended, normal bowel sounds, no masses or organomegaly  Musculoskeletal: normal range of motion, no joint swelling, deformity or tenderness  Neurologic: gait and coordination normal and speech normal       Allergies   Allergen Reactions    Statins      Other reaction(s): Pain  Pain in back of legs    Medrol [Methylprednisolone]     Morphine      Kidney stone    Quinine Sulfate [Quinine]      Prior to Visit Medications    Medication Sig Taking? Authorizing Provider   Turmeric 500 MG CAPS Take by mouth  Yes Historical Provider, MD   amLODIPine-benazepril (LOTREL) 5-10 MG per capsule Take 1 capsule by mouth daily Yes Historical Provider, MD   Omega-3 Fatty Acids (FISH OIL) 1000 MG CAPS Take 3,000 mg by mouth 2 times daily  Yes Historical Provider, MD   aspirin 81 MG chewable tablet Take 81 mg by mouth once a week  Yes Historical Provider, MD   gabapentin (NEURONTIN) 400 MG capsule 400 mg 2 times daily. Yes Historical Provider, MD   Garlic 8337 MG CAPS Take by mouth  Yes Historical Provider, MD   Red Yeast Rice Extract (RED YEAST RICE PO) Take by mouth once a week  Yes Historical Provider, MD   Cyanocobalamin (VITAMIN B-12 PO) Take by mouth  Patient not taking: Reported on 6/9/2022  Historical Provider, MD   hydrocortisone 2.5 % cream Apply topically 2 times daily Apply topically 2 times daily.   Patient not taking: Reported on 6/30/2022  Esther Nyhan, APRN - CNP   Cholecalciferol (VITAMIN D3) 50 MCG (2000 UT) CAPS Take by mouth  Patient not taking: Reported on 6/9/2022  Historical Provider, MD   NONFORMULARY Take by mouth daily NERVE RENEW  Contains:  B-1, B-12 2000 mcg, R-Alpha Lopic Acid 150 mg, B-6 4 mg, Riboflavin 4 mg, Vitamin D 500 u, Benfodaime 300 mg, Proprietary Blend 43 mg  Patient not taking: Reported on 6/9/2022  Historical Provider, MD   Multiple Vitamins-Minerals (CENTRUM SILVER PO) Take by mouth  Patient not taking: Reported on 6/9/2022  Historical Provider, MD   Coenzyme Q10 (CO Q-10) 50 MG CAPS Take 100 mg by mouth daily   Patient not taking: Reported on 6/9/2022  Historical Provider, MD Celestin (Including outside providers/suppliers regularly involved in providing care):   Patient Care Team:  LOBITO Harden CNP as PCP - General (Certified Nurse Practitioner)  LOBITO Harden CNP as PCP - [facility] Empaneled Provider     Reviewed and updated this visit:  Tobacco  Allergies  Meds  Med Hx  Surg Hx  Soc Hx  Fam Hx          This document was prepared by a combination of typing and transcription through a voice recognition software.     LOBITO Harden CNP

## 2025-04-15 ENCOUNTER — OFFICE VISIT (OUTPATIENT)
Dept: FAMILY MEDICINE CLINIC | Age: 80
End: 2025-04-15

## 2025-04-15 VITALS
DIASTOLIC BLOOD PRESSURE: 80 MMHG | WEIGHT: 183 LBS | BODY MASS INDEX: 25.62 KG/M2 | SYSTOLIC BLOOD PRESSURE: 144 MMHG | HEART RATE: 67 BPM | OXYGEN SATURATION: 97 % | TEMPERATURE: 97.8 F | HEIGHT: 71 IN

## 2025-04-15 DIAGNOSIS — Z85.46 PERSONAL HISTORY OF PROSTATE CANCER: ICD-10-CM

## 2025-04-15 DIAGNOSIS — J30.2 SEASONAL ALLERGIES: Primary | ICD-10-CM

## 2025-04-15 RX ORDER — ALBUTEROL SULFATE 90 UG/1
2 INHALANT RESPIRATORY (INHALATION) EVERY 6 HOURS PRN
Qty: 18 G | Refills: 0 | Status: SHIPPED | OUTPATIENT
Start: 2025-04-15

## 2025-04-15 RX ORDER — MONTELUKAST SODIUM 10 MG/1
10 TABLET ORAL NIGHTLY
Qty: 90 TABLET | Refills: 1 | Status: SHIPPED | OUTPATIENT
Start: 2025-04-15

## 2025-04-15 NOTE — PROGRESS NOTES
Shashank Ortega (:  1945) is a 79 y.o. male,Established patient, here for evaluation of the following chief complaint(s):  Nasal Congestion and Other (Sinus pressure for the 3-4 weeks)           Assessment & Plan  Seasonal allergies    Continue with a nasal steroid I recommend switching from Flonase to either Nasacort or Rhinocort to see if you get a better response from this.  Continue with an oral antihistamine.  If you have been using Zyrtec for a while try switching to Allegra Claritin or Xyzal.  In addition to this using nasal saline after you come in from being outdoors will help decrease some of the pollen irritation and exposure.  Added the montelukast for allergy treatment.  And prescribed albuterol for if you feel like it starting to cause the bronchitis to flareup in the lungs.    Orders:    montelukast (SINGULAIR) 10 MG tablet; Take 1 tablet by mouth nightly    albuterol sulfate HFA (PROVENTIL;VENTOLIN;PROAIR) 108 (90 Base) MCG/ACT inhaler; Inhale 2 puffs into the lungs every 6 hours as needed for Wheezing    Personal history of prostate cancer    Stable [facility]           Return if symptoms worsen or fail to improve.       Subjective   HPI  79-year-old male here for an acute office visit complains of congestion runny nose hoarseness.  No cough or wheeze.  No headache or chest pain.  Reports this has been going on for several weeks    Review of Systems       Objective   Vitals:    04/15/25 0913   BP: (!) 156/90   Pulse: 67   Temp: 97.8 °F (36.6 °C)   SpO2: 97%     Physical Exam  HENT:      Head: Normocephalic and atraumatic.      Mouth/Throat:      Mouth: Mucous membranes are moist.   Eyes:      Extraocular Movements: Extraocular movements intact.   Cardiovascular:      Rate and Rhythm: Normal rate and regular rhythm.   Pulmonary:      Effort: Pulmonary effort is normal.      Breath sounds: No wheezing, rhonchi

## 2025-05-14 ENCOUNTER — TELEPHONE (OUTPATIENT)
Dept: FAMILY MEDICINE CLINIC | Age: 80
End: 2025-05-14

## 2025-05-14 NOTE — TELEPHONE ENCOUNTER
Pt saw cardiology Dr Rosario yesterday and he told pt he wanted his PCP to order an US of the gallbladder due to his stomach issues he's having. He is also scheduled for Stress Test on 06/06

## 2025-05-15 ENCOUNTER — OFFICE VISIT (OUTPATIENT)
Dept: FAMILY MEDICINE CLINIC | Age: 80
End: 2025-05-15

## 2025-05-15 VITALS
BODY MASS INDEX: 24.97 KG/M2 | WEIGHT: 179 LBS | DIASTOLIC BLOOD PRESSURE: 80 MMHG | SYSTOLIC BLOOD PRESSURE: 136 MMHG | RESPIRATION RATE: 20 BRPM | OXYGEN SATURATION: 98 % | HEART RATE: 64 BPM

## 2025-05-15 DIAGNOSIS — R06.9 UNSPECIFIED ABNORMALITIES OF BREATHING: ICD-10-CM

## 2025-05-15 DIAGNOSIS — R53.83 OTHER FATIGUE: ICD-10-CM

## 2025-05-15 DIAGNOSIS — R14.2 BELCHING: ICD-10-CM

## 2025-05-15 DIAGNOSIS — I10 PRIMARY HYPERTENSION: ICD-10-CM

## 2025-05-15 DIAGNOSIS — K21.9 GASTROESOPHAGEAL REFLUX DISEASE, UNSPECIFIED WHETHER ESOPHAGITIS PRESENT: Primary | ICD-10-CM

## 2025-05-15 LAB
ALBUMIN SERPL-MCNC: 4.4 G/DL (ref 3.4–5)
ALBUMIN/GLOB SERPL: 1.6 {RATIO} (ref 1.1–2.2)
ALP SERPL-CCNC: 67 U/L (ref 40–129)
ALT SERPL-CCNC: 15 U/L (ref 10–40)
ANION GAP SERPL CALCULATED.3IONS-SCNC: 9 MMOL/L (ref 3–16)
AST SERPL-CCNC: 19 U/L (ref 15–37)
BASOPHILS # BLD: 0 K/UL (ref 0–0.2)
BASOPHILS NFR BLD: 0.7 %
BILIRUB SERPL-MCNC: 1.1 MG/DL (ref 0–1)
BUN SERPL-MCNC: 25 MG/DL (ref 7–20)
CALCIUM SERPL-MCNC: 9.5 MG/DL (ref 8.3–10.6)
CHLORIDE SERPL-SCNC: 103 MMOL/L (ref 99–110)
CO2 SERPL-SCNC: 29 MMOL/L (ref 21–32)
CREAT SERPL-MCNC: 1.1 MG/DL (ref 0.8–1.3)
DEPRECATED RDW RBC AUTO: 12.9 % (ref 12.4–15.4)
EOSINOPHIL # BLD: 0.1 K/UL (ref 0–0.6)
EOSINOPHIL NFR BLD: 1.5 %
GFR SERPLBLD CREATININE-BSD FMLA CKD-EPI: 68 ML/MIN/{1.73_M2}
GLUCOSE SERPL-MCNC: 96 MG/DL (ref 70–99)
HCT VFR BLD AUTO: 50.6 % (ref 40.5–52.5)
HGB BLD-MCNC: 17.6 G/DL (ref 13.5–17.5)
LIPASE SERPL-CCNC: 40 U/L (ref 13–60)
LYMPHOCYTES # BLD: 1.8 K/UL (ref 1–5.1)
LYMPHOCYTES NFR BLD: 33.9 %
MCH RBC QN AUTO: 32.4 PG (ref 26–34)
MCHC RBC AUTO-ENTMCNC: 34.7 G/DL (ref 31–36)
MCV RBC AUTO: 93.5 FL (ref 80–100)
MONOCYTES # BLD: 0.4 K/UL (ref 0–1.3)
MONOCYTES NFR BLD: 7.6 %
NEUTROPHILS # BLD: 2.9 K/UL (ref 1.7–7.7)
NEUTROPHILS NFR BLD: 56.3 %
NT-PROBNP SERPL-MCNC: <36 PG/ML (ref 0–449)
PLATELET # BLD AUTO: 188 K/UL (ref 135–450)
PMV BLD AUTO: 8.5 FL (ref 5–10.5)
POTASSIUM SERPL-SCNC: 4.3 MMOL/L (ref 3.5–5.1)
PROT SERPL-MCNC: 7.2 G/DL (ref 6.4–8.2)
RBC # BLD AUTO: 5.41 M/UL (ref 4.2–5.9)
SODIUM SERPL-SCNC: 141 MMOL/L (ref 136–145)
TSH SERPL DL<=0.005 MIU/L-ACNC: 0.86 UIU/ML (ref 0.27–4.2)
WBC # BLD AUTO: 5.2 K/UL (ref 4–11)

## 2025-05-15 NOTE — PROGRESS NOTES
Shashank Ortega (:  1945) is a 79 y.o. male,Established patient, here for evaluation of the following chief complaint(s):  Abdominal Pain           Assessment & Plan  Gastroesophageal reflux disease, unspecified whether esophagitis present    Lab work and ultrasound are ordered.  Low FODMAP food information was presented to the patient.  Advised to keep a diary particular to note which foods cause the most distress and distention.  May try over-the-counter Gas-X for symptom relief.  Encouraged treatment of the constipation.  Patient is a former smoker abdominal ultrasound will also evaluate the aortic calcifications noted on previous CT.    Orders:    CBC with Auto Differential    Comprehensive Metabolic Panel    Lipase    TSH reflex to FT4    US ABDOMEN COMPLETE; Future    Belching    As above    Orders:    US ABDOMEN COMPLETE; Future    Brain Natriuretic Peptide    Other fatigue    As above    Orders:    CBC with Auto Differential    Comprehensive Metabolic Panel    Lipase    TSH reflex to FT4    Brain Natriuretic Peptide    Primary hypertension    Continue current medication.    Orders:    Brain Natriuretic Peptide    Unspecified abnormalities of breathing    BNP ordered previous CT did show cardiomegaly.    Orders:    Brain Natriuretic Peptide      Return in about 1 week (around 2025) for follow up abdominal issues.       Subjective   HPI  79-year-old male with history of prostate cancer coronary artery disease presents after follow-up with his cardiologist.  Reports he has been having symptoms for at least 3 months possibly as far back as 6 months regarding increased fatigue belching abdominal distention regurgitation including occasional vomiting.  Cannot identify any specific triggering foods.  Has not kept a food diary.  Does have a history of constipation.  Has not been regularly taking his stool softeners.    Review of Systems       Objective   Vitals:    05/15/25 1048   BP: 136/80   Pulse:

## 2025-05-16 ENCOUNTER — HOSPITAL ENCOUNTER (OUTPATIENT)
Dept: ULTRASOUND IMAGING | Age: 80
Discharge: HOME | End: 2025-05-16
Attending: FAMILY MEDICINE
Payer: MEDICARE

## 2025-05-16 DIAGNOSIS — R14.2 BELCHING: ICD-10-CM

## 2025-05-16 DIAGNOSIS — K21.9 GASTROESOPHAGEAL REFLUX DISEASE, UNSPECIFIED WHETHER ESOPHAGITIS PRESENT: ICD-10-CM

## 2025-05-16 PROCEDURE — 76700 US EXAM ABDOM COMPLETE: CPT

## 2025-05-19 ENCOUNTER — RESULTS FOLLOW-UP (OUTPATIENT)
Dept: FAMILY MEDICINE CLINIC | Age: 80
End: 2025-05-19

## 2025-05-21 NOTE — PROGRESS NOTES
Shashank Ortega (:  1945) is a 79 y.o. male,Established patient, here for evaluation of the following chief complaint(s):  Follow-up (1 wek follow up for us and bw)           Assessment & Plan  Gastroesophageal reflux disease, unspecified whether esophagitis present    Continue omeprazole    Orders:    omeprazole (PRILOSEC) 20 MG delayed release capsule; Take 1 capsule by mouth every morning (before breakfast)    Primary hypertension    Start hydrochlorothiazide this should help with blood pressure and has the potential to decrease kidney stone formation.    Orders:    hydroCHLOROthiazide 12.5 MG capsule; Take 1 capsule by mouth every morning    Coronary artery disease involving native coronary artery of native heart without angina pectoris  Follow-up with stress test scheduled for next month         Kidney stone  Increase your fluid intake.  We talked about acidifying the urine with sour foods and drinks.  Hydrochlorothiazide for calcium reabsorption    Orders:    hydroCHLOROthiazide 12.5 MG capsule; Take 1 capsule by mouth every morning      Return in about 6 months (around 2025) for hypertension.       Subjective   HPI  79-year-old male here for a follow-up visit reviewed labs and testing.  Discussed the presence of the kidney stone.  Continue to treat the abdominal symptoms with Prilosec and Gas-X as needed.  Treat the constipation consistently.    Review of Systems       Objective   Vitals:    25 0850   BP: 130/76   Pulse: 60   Resp: 20   SpO2: 98%     Physical Exam  HENT:      Head: Normocephalic and atraumatic.   Eyes:      Extraocular Movements: Extraocular movements intact.   Cardiovascular:      Rate and Rhythm: Normal rate and regular rhythm.   Pulmonary:      Effort: Pulmonary effort is normal.      Breath sounds: No wheezing, rhonchi or rales.   Musculoskeletal:      Cervical back: Neck supple.   Neurological:      Mental Status: He is alert and oriented to person, place, and

## 2025-05-22 ENCOUNTER — OFFICE VISIT (OUTPATIENT)
Dept: FAMILY MEDICINE CLINIC | Age: 80
End: 2025-05-22

## 2025-05-22 VITALS
HEART RATE: 60 BPM | OXYGEN SATURATION: 98 % | WEIGHT: 180 LBS | DIASTOLIC BLOOD PRESSURE: 76 MMHG | BODY MASS INDEX: 25.1 KG/M2 | RESPIRATION RATE: 20 BRPM | SYSTOLIC BLOOD PRESSURE: 130 MMHG

## 2025-05-22 DIAGNOSIS — K21.9 GASTROESOPHAGEAL REFLUX DISEASE, UNSPECIFIED WHETHER ESOPHAGITIS PRESENT: Primary | ICD-10-CM

## 2025-05-22 DIAGNOSIS — I25.10 CORONARY ARTERY DISEASE INVOLVING NATIVE CORONARY ARTERY OF NATIVE HEART WITHOUT ANGINA PECTORIS: ICD-10-CM

## 2025-05-22 DIAGNOSIS — N20.0 KIDNEY STONE: ICD-10-CM

## 2025-05-22 DIAGNOSIS — I10 PRIMARY HYPERTENSION: ICD-10-CM

## 2025-05-22 RX ORDER — HYDROCHLOROTHIAZIDE 12.5 MG/1
12.5 CAPSULE ORAL EVERY MORNING
Qty: 90 CAPSULE | Refills: 1 | Status: SHIPPED | OUTPATIENT
Start: 2025-05-22

## 2025-05-22 RX ORDER — OMEPRAZOLE 20 MG/1
20 CAPSULE, DELAYED RELEASE ORAL
Qty: 90 CAPSULE | Refills: 1 | Status: SHIPPED | OUTPATIENT
Start: 2025-05-22

## 2025-05-22 NOTE — ASSESSMENT & PLAN NOTE
Start hydrochlorothiazide this should help with blood pressure and has the potential to decrease kidney stone formation.    Orders:    hydroCHLOROthiazide 12.5 MG capsule; Take 1 capsule by mouth every morning

## (undated) DEVICE — NEEDLE HYPO 25GA L1.5IN BLU POLYPR HUB S STL REG BVL STR

## (undated) DEVICE — PREP SOL PVP IODINE 4%  4 OZ/BTL

## (undated) DEVICE — Device

## (undated) DEVICE — SURGICAL PROCEDURE PACK EYE CLERMONT

## (undated) DEVICE — 6 ML SYRINGE LUER-LOCK TIP: Brand: MONOJECT

## (undated) DEVICE — GLOVE ORANGE PI 8   MSG9080